# Patient Record
Sex: MALE | Race: WHITE | Employment: FULL TIME | URBAN - METROPOLITAN AREA
[De-identification: names, ages, dates, MRNs, and addresses within clinical notes are randomized per-mention and may not be internally consistent; named-entity substitution may affect disease eponyms.]

---

## 2017-06-29 ENCOUNTER — TELEPHONE (OUTPATIENT)
Dept: INTERNAL MEDICINE | Age: 52
End: 2017-06-29

## 2017-06-29 DIAGNOSIS — Z12.5 SCREENING PSA (PROSTATE SPECIFIC ANTIGEN): ICD-10-CM

## 2017-06-29 DIAGNOSIS — E11.9 DIABETES MELLITUS WITH NO COMPLICATION (HCC): Primary | ICD-10-CM

## 2017-07-08 DIAGNOSIS — Z12.5 SCREENING PSA (PROSTATE SPECIFIC ANTIGEN): ICD-10-CM

## 2017-07-08 DIAGNOSIS — E11.9 DIABETES MELLITUS WITH NO COMPLICATION (HCC): ICD-10-CM

## 2017-07-08 LAB
A/G RATIO: 1.4 (ref 1.1–2.2)
ALBUMIN SERPL-MCNC: 4.6 G/DL (ref 3.4–5)
ALP BLD-CCNC: 73 U/L (ref 40–129)
ALT SERPL-CCNC: 47 U/L (ref 10–40)
ANION GAP SERPL CALCULATED.3IONS-SCNC: 13 MMOL/L (ref 3–16)
AST SERPL-CCNC: 29 U/L (ref 15–37)
BASOPHILS ABSOLUTE: 0 K/UL (ref 0–0.2)
BASOPHILS RELATIVE PERCENT: 0.4 %
BILIRUB SERPL-MCNC: 0.4 MG/DL (ref 0–1)
BILIRUBIN URINE: NEGATIVE
BLOOD, URINE: NEGATIVE
BUN BLDV-MCNC: 19 MG/DL (ref 7–20)
CALCIUM SERPL-MCNC: 9.5 MG/DL (ref 8.3–10.6)
CHLORIDE BLD-SCNC: 104 MMOL/L (ref 99–110)
CHOLESTEROL, TOTAL: 132 MG/DL (ref 0–199)
CLARITY: CLEAR
CO2: 26 MMOL/L (ref 21–32)
COLOR: YELLOW
CREAT SERPL-MCNC: 0.7 MG/DL (ref 0.9–1.3)
CREATININE URINE: 119.1 MG/DL (ref 39–259)
EOSINOPHILS ABSOLUTE: 0.1 K/UL (ref 0–0.6)
EOSINOPHILS RELATIVE PERCENT: 1.8 %
GFR AFRICAN AMERICAN: >60
GFR NON-AFRICAN AMERICAN: >60
GLOBULIN: 3.3 G/DL
GLUCOSE BLD-MCNC: 148 MG/DL (ref 70–99)
GLUCOSE URINE: NEGATIVE MG/DL
HCT VFR BLD CALC: 47.3 % (ref 40.5–52.5)
HDLC SERPL-MCNC: 37 MG/DL (ref 40–60)
HEMOGLOBIN: 15.5 G/DL (ref 13.5–17.5)
KETONES, URINE: NEGATIVE MG/DL
LDL CHOLESTEROL CALCULATED: 73 MG/DL
LEUKOCYTE ESTERASE, URINE: NEGATIVE
LYMPHOCYTES ABSOLUTE: 3 K/UL (ref 1–5.1)
LYMPHOCYTES RELATIVE PERCENT: 37.7 %
MCH RBC QN AUTO: 29.8 PG (ref 26–34)
MCHC RBC AUTO-ENTMCNC: 32.9 G/DL (ref 31–36)
MCV RBC AUTO: 90.8 FL (ref 80–100)
MICROALBUMIN UR-MCNC: <1.2 MG/DL
MICROALBUMIN/CREAT UR-RTO: NORMAL MG/G (ref 0–30)
MICROSCOPIC EXAMINATION: NORMAL
MONOCYTES ABSOLUTE: 0.8 K/UL (ref 0–1.3)
MONOCYTES RELATIVE PERCENT: 10.3 %
NEUTROPHILS ABSOLUTE: 4 K/UL (ref 1.7–7.7)
NEUTROPHILS RELATIVE PERCENT: 49.8 %
NITRITE, URINE: NEGATIVE
PDW BLD-RTO: 13.9 % (ref 12.4–15.4)
PH UA: 6
PLATELET # BLD: 130 K/UL (ref 135–450)
PMV BLD AUTO: 10.6 FL (ref 5–10.5)
POTASSIUM SERPL-SCNC: 5.6 MMOL/L (ref 3.5–5.1)
PROSTATE SPECIFIC ANTIGEN: 0.7 NG/ML (ref 0–4)
PROTEIN UA: NEGATIVE MG/DL
RBC # BLD: 5.21 M/UL (ref 4.2–5.9)
SODIUM BLD-SCNC: 143 MMOL/L (ref 136–145)
SPECIFIC GRAVITY UA: 1.02
TOTAL PROTEIN: 7.9 G/DL (ref 6.4–8.2)
TRIGL SERPL-MCNC: 111 MG/DL (ref 0–150)
TSH REFLEX FT4: 2.73 UIU/ML (ref 0.27–4.2)
URINE TYPE: NORMAL
UROBILINOGEN, URINE: 0.2 E.U./DL
VLDLC SERPL CALC-MCNC: 22 MG/DL
WBC # BLD: 8 K/UL (ref 4–11)

## 2017-07-09 LAB
ESTIMATED AVERAGE GLUCOSE: 151.3 MG/DL
HBA1C MFR BLD: 6.9 %

## 2017-07-11 ENCOUNTER — OFFICE VISIT (OUTPATIENT)
Dept: INTERNAL MEDICINE | Age: 52
End: 2017-07-11

## 2017-07-11 VITALS
WEIGHT: 315 LBS | HEIGHT: 76 IN | SYSTOLIC BLOOD PRESSURE: 138 MMHG | HEART RATE: 68 BPM | DIASTOLIC BLOOD PRESSURE: 78 MMHG | BODY MASS INDEX: 38.36 KG/M2

## 2017-07-11 DIAGNOSIS — Z23 NEED FOR TDAP VACCINATION: ICD-10-CM

## 2017-07-11 DIAGNOSIS — Z00.00 PREVENTATIVE HEALTH CARE: Primary | ICD-10-CM

## 2017-07-11 DIAGNOSIS — E11.9 DIABETES MELLITUS WITH NO COMPLICATION (HCC): ICD-10-CM

## 2017-07-11 DIAGNOSIS — E11.9 TYPE 2 DIABETES MELLITUS WITHOUT COMPLICATION, WITHOUT LONG-TERM CURRENT USE OF INSULIN (HCC): ICD-10-CM

## 2017-07-11 LAB
HEMOCCULT STL QL: NORMAL

## 2017-07-11 PROCEDURE — 90471 IMMUNIZATION ADMIN: CPT | Performed by: INTERNAL MEDICINE

## 2017-07-11 PROCEDURE — 99396 PREV VISIT EST AGE 40-64: CPT | Performed by: INTERNAL MEDICINE

## 2017-07-11 PROCEDURE — 90715 TDAP VACCINE 7 YRS/> IM: CPT | Performed by: INTERNAL MEDICINE

## 2017-07-11 PROCEDURE — 82270 OCCULT BLOOD FECES: CPT | Performed by: INTERNAL MEDICINE

## 2017-07-11 PROCEDURE — 93000 ELECTROCARDIOGRAM COMPLETE: CPT | Performed by: INTERNAL MEDICINE

## 2017-07-11 RX ORDER — ATORVASTATIN CALCIUM 10 MG/1
10 TABLET, FILM COATED ORAL DAILY
Qty: 30 TABLET | Refills: 11 | Status: SHIPPED | OUTPATIENT
Start: 2017-07-11 | End: 2018-05-04 | Stop reason: SDUPTHER

## 2017-07-11 RX ORDER — SITAGLIPTIN AND METFORMIN HYDROCHLORIDE 100; 1000 MG/1; MG/1
TABLET, FILM COATED, EXTENDED RELEASE ORAL
Qty: 30 TABLET | Refills: 5 | Status: SHIPPED | OUTPATIENT
Start: 2017-07-11 | End: 2017-07-11 | Stop reason: SDUPTHER

## 2017-07-11 ASSESSMENT — ENCOUNTER SYMPTOMS
APNEA: 0
RESPIRATORY NEGATIVE: 1
GASTROINTESTINAL NEGATIVE: 1

## 2017-07-11 ASSESSMENT — PATIENT HEALTH QUESTIONNAIRE - PHQ9
SUM OF ALL RESPONSES TO PHQ QUESTIONS 1-9: 0
SUM OF ALL RESPONSES TO PHQ9 QUESTIONS 1 & 2: 0
1. LITTLE INTEREST OR PLEASURE IN DOING THINGS: 0
2. FEELING DOWN, DEPRESSED OR HOPELESS: 0

## 2018-01-20 DIAGNOSIS — E11.9 DIABETES MELLITUS WITH NO COMPLICATION (HCC): ICD-10-CM

## 2018-01-20 LAB
A/G RATIO: 1.5 (ref 1.1–2.2)
ALBUMIN SERPL-MCNC: 4.4 G/DL (ref 3.4–5)
ALP BLD-CCNC: 70 U/L (ref 40–129)
ALT SERPL-CCNC: 26 U/L (ref 10–40)
ANION GAP SERPL CALCULATED.3IONS-SCNC: 11 MMOL/L (ref 3–16)
AST SERPL-CCNC: 19 U/L (ref 15–37)
BILIRUB SERPL-MCNC: 0.4 MG/DL (ref 0–1)
BUN BLDV-MCNC: 20 MG/DL (ref 7–20)
CALCIUM SERPL-MCNC: 9.2 MG/DL (ref 8.3–10.6)
CHLORIDE BLD-SCNC: 101 MMOL/L (ref 99–110)
CHOLESTEROL, TOTAL: 96 MG/DL (ref 0–199)
CO2: 27 MMOL/L (ref 21–32)
CREAT SERPL-MCNC: 0.7 MG/DL (ref 0.9–1.3)
GFR AFRICAN AMERICAN: >60
GFR NON-AFRICAN AMERICAN: >60
GLOBULIN: 2.9 G/DL
GLUCOSE BLD-MCNC: 139 MG/DL (ref 70–99)
HDLC SERPL-MCNC: 40 MG/DL (ref 40–60)
LDL CHOLESTEROL CALCULATED: 41 MG/DL
POTASSIUM SERPL-SCNC: 5.3 MMOL/L (ref 3.5–5.1)
SODIUM BLD-SCNC: 139 MMOL/L (ref 136–145)
TOTAL PROTEIN: 7.3 G/DL (ref 6.4–8.2)
TRIGL SERPL-MCNC: 73 MG/DL (ref 0–150)
VITAMIN B-12: 730 PG/ML (ref 211–911)
VLDLC SERPL CALC-MCNC: 15 MG/DL

## 2018-01-21 LAB
ESTIMATED AVERAGE GLUCOSE: 151.3 MG/DL
HBA1C MFR BLD: 6.9 %

## 2018-01-23 ENCOUNTER — TELEPHONE (OUTPATIENT)
Dept: INTERNAL MEDICINE | Age: 53
End: 2018-01-23

## 2018-02-05 RX ORDER — SITAGLIPTIN AND METFORMIN HYDROCHLORIDE 100; 1000 MG/1; MG/1
TABLET, FILM COATED, EXTENDED RELEASE ORAL
Qty: 30 TABLET | Refills: 4 | Status: SHIPPED | OUTPATIENT
Start: 2018-02-05 | End: 2018-05-04 | Stop reason: SDUPTHER

## 2018-05-04 ENCOUNTER — OFFICE VISIT (OUTPATIENT)
Dept: INTERNAL MEDICINE | Age: 53
End: 2018-05-04

## 2018-05-04 VITALS
BODY MASS INDEX: 38.11 KG/M2 | WEIGHT: 313 LBS | HEART RATE: 73 BPM | DIASTOLIC BLOOD PRESSURE: 88 MMHG | HEIGHT: 76 IN | SYSTOLIC BLOOD PRESSURE: 138 MMHG | OXYGEN SATURATION: 98 %

## 2018-05-04 DIAGNOSIS — E11.9 TYPE 2 DIABETES MELLITUS WITHOUT COMPLICATION, WITHOUT LONG-TERM CURRENT USE OF INSULIN (HCC): Primary | ICD-10-CM

## 2018-05-04 DIAGNOSIS — R41.3 MEMORY DISTURBANCE: ICD-10-CM

## 2018-05-04 DIAGNOSIS — Z91.09 SENSITIVITY TO SUNLIGHT: ICD-10-CM

## 2018-05-04 PROCEDURE — 99214 OFFICE O/P EST MOD 30 MIN: CPT | Performed by: INTERNAL MEDICINE

## 2018-05-04 RX ORDER — ATORVASTATIN CALCIUM 10 MG/1
10 TABLET, FILM COATED ORAL DAILY
Qty: 30 TABLET | Refills: 11 | Status: SHIPPED | OUTPATIENT
Start: 2018-05-04 | End: 2018-06-13 | Stop reason: SDUPTHER

## 2018-05-04 ASSESSMENT — ENCOUNTER SYMPTOMS
COLOR CHANGE: 1
RESPIRATORY NEGATIVE: 1
GASTROINTESTINAL NEGATIVE: 1
ALLERGIC/IMMUNOLOGIC NEGATIVE: 1

## 2018-05-18 ENCOUNTER — TELEPHONE (OUTPATIENT)
Dept: INTERNAL MEDICINE | Age: 53
End: 2018-05-18

## 2018-06-13 RX ORDER — ATORVASTATIN CALCIUM 10 MG/1
10 TABLET, FILM COATED ORAL DAILY
Qty: 30 TABLET | Refills: 10 | Status: SHIPPED | OUTPATIENT
Start: 2018-06-13 | End: 2019-05-11 | Stop reason: SDUPTHER

## 2018-06-18 RX ORDER — SITAGLIPTIN AND METFORMIN HYDROCHLORIDE 100; 1000 MG/1; MG/1
TABLET, FILM COATED, EXTENDED RELEASE ORAL
Qty: 30 TABLET | Refills: 3 | Status: SHIPPED | OUTPATIENT
Start: 2018-06-18 | End: 2018-11-11 | Stop reason: SDUPTHER

## 2018-07-06 DIAGNOSIS — E11.9 TYPE 2 DIABETES MELLITUS WITHOUT COMPLICATION, WITHOUT LONG-TERM CURRENT USE OF INSULIN (HCC): ICD-10-CM

## 2018-07-06 LAB
A/G RATIO: 1.6 (ref 1.1–2.2)
ALBUMIN SERPL-MCNC: 4.5 G/DL (ref 3.4–5)
ALP BLD-CCNC: 73 U/L (ref 40–129)
ALT SERPL-CCNC: 29 U/L (ref 10–40)
ANION GAP SERPL CALCULATED.3IONS-SCNC: 12 MMOL/L (ref 3–16)
AST SERPL-CCNC: 20 U/L (ref 15–37)
BILIRUB SERPL-MCNC: 0.4 MG/DL (ref 0–1)
BUN BLDV-MCNC: 21 MG/DL (ref 7–20)
CALCIUM SERPL-MCNC: 9.4 MG/DL (ref 8.3–10.6)
CHLORIDE BLD-SCNC: 102 MMOL/L (ref 99–110)
CHOLESTEROL, TOTAL: 92 MG/DL (ref 0–199)
CO2: 27 MMOL/L (ref 21–32)
CREAT SERPL-MCNC: 0.8 MG/DL (ref 0.9–1.3)
GFR AFRICAN AMERICAN: >60
GFR NON-AFRICAN AMERICAN: >60
GLOBULIN: 2.8 G/DL
GLUCOSE BLD-MCNC: 142 MG/DL (ref 70–99)
HDLC SERPL-MCNC: 37 MG/DL (ref 40–60)
LDL CHOLESTEROL CALCULATED: 39 MG/DL
POTASSIUM SERPL-SCNC: 5 MMOL/L (ref 3.5–5.1)
SODIUM BLD-SCNC: 141 MMOL/L (ref 136–145)
TOTAL PROTEIN: 7.3 G/DL (ref 6.4–8.2)
TRIGL SERPL-MCNC: 82 MG/DL (ref 0–150)
VLDLC SERPL CALC-MCNC: 16 MG/DL

## 2018-07-07 LAB
ESTIMATED AVERAGE GLUCOSE: 151.3 MG/DL
HBA1C MFR BLD: 6.9 %

## 2018-09-26 PROBLEM — Z00.00 PREVENTATIVE HEALTH CARE: Status: RESOLVED | Noted: 2017-07-11 | Resolved: 2018-09-26

## 2018-11-12 RX ORDER — SITAGLIPTIN AND METFORMIN HYDROCHLORIDE 100; 1000 MG/1; MG/1
TABLET, FILM COATED, EXTENDED RELEASE ORAL
Qty: 30 TABLET | Refills: 2 | Status: SHIPPED | OUTPATIENT
Start: 2018-11-12 | End: 2019-02-05 | Stop reason: SDUPTHER

## 2019-02-05 RX ORDER — SITAGLIPTIN AND METFORMIN HYDROCHLORIDE 100; 1000 MG/1; MG/1
TABLET, FILM COATED, EXTENDED RELEASE ORAL
Qty: 30 TABLET | Refills: 5 | Status: SHIPPED | OUTPATIENT
Start: 2019-02-05 | End: 2019-08-05 | Stop reason: SDUPTHER

## 2019-05-13 RX ORDER — ATORVASTATIN CALCIUM 10 MG/1
10 TABLET, FILM COATED ORAL DAILY
Qty: 30 TABLET | Refills: 0 | Status: SHIPPED | OUTPATIENT
Start: 2019-05-13 | End: 2019-06-13 | Stop reason: SDUPTHER

## 2019-06-12 RX ORDER — ATORVASTATIN CALCIUM 10 MG/1
TABLET, FILM COATED ORAL
Qty: 30 TABLET | Refills: 0 | OUTPATIENT
Start: 2019-06-12

## 2019-06-12 NOTE — TELEPHONE ENCOUNTER
Patient has not been seen since 05/04/18. Patient needs to schedule appointment for prescription to be refilled.

## 2019-06-13 RX ORDER — ATORVASTATIN CALCIUM 10 MG/1
TABLET, FILM COATED ORAL
Qty: 30 TABLET | Refills: 0 | Status: SHIPPED | OUTPATIENT
Start: 2019-06-13 | End: 2019-07-06 | Stop reason: SDUPTHER

## 2019-06-21 ENCOUNTER — TELEPHONE (OUTPATIENT)
Dept: INTERNAL MEDICINE CLINIC | Age: 54
End: 2019-06-21

## 2019-06-21 ENCOUNTER — OFFICE VISIT (OUTPATIENT)
Dept: INTERNAL MEDICINE CLINIC | Age: 54
End: 2019-06-21
Payer: COMMERCIAL

## 2019-06-21 VITALS
BODY MASS INDEX: 39.8 KG/M2 | DIASTOLIC BLOOD PRESSURE: 80 MMHG | HEART RATE: 105 BPM | SYSTOLIC BLOOD PRESSURE: 160 MMHG | OXYGEN SATURATION: 97 % | WEIGHT: 315 LBS

## 2019-06-21 DIAGNOSIS — J02.9 SORE THROAT: Primary | ICD-10-CM

## 2019-06-21 DIAGNOSIS — B08.4 HAND, FOOT AND MOUTH DISEASE: ICD-10-CM

## 2019-06-21 LAB — S PYO AG THROAT QL: NORMAL

## 2019-06-21 PROCEDURE — 99213 OFFICE O/P EST LOW 20 MIN: CPT | Performed by: NURSE PRACTITIONER

## 2019-06-21 PROCEDURE — 87880 STREP A ASSAY W/OPTIC: CPT | Performed by: NURSE PRACTITIONER

## 2019-06-21 RX ORDER — LIDOCAINE HYDROCHLORIDE 20 MG/ML
15 SOLUTION OROPHARYNGEAL PRN
Qty: 100 ML | Refills: 0 | Status: SHIPPED | OUTPATIENT
Start: 2019-06-21 | End: 2020-05-29

## 2019-06-21 NOTE — TELEPHONE ENCOUNTER
[facility]'S needs to know directions (frequency of use) for the Lidocaine prescribed.   Please call [phone]

## 2019-06-21 NOTE — PROGRESS NOTES
Subjective:      Patient ID: Shiv Lund is a 47 y.o. male. Chief Complaint   Patient presents with    Pharyngitis     blisters behind the gums       HPI   Kevin Stockton is here for blisters and open sores in mouth and throat. Symptoms began 2 days ago. Sores are present on his throat, gums, and sides of tongue. He denies fever. Was in contact with his grandsons who were both sick with fevers last week. He reports pain with eating and swallowing. Feels like sores are getting worse. Denies open lesions to any other part of his body. Review of Systems   Constitutional: Negative for chills, fatigue and fever. HENT: Positive for mouth sores and sore throat. Negative for congestion, postnasal drip, rhinorrhea, sinus pressure and sinus pain. Respiratory: Negative for cough, shortness of breath and wheezing. Cardiovascular: Negative for chest pain and palpitations. Gastrointestinal: Negative for abdominal pain, constipation and diarrhea. Musculoskeletal: Negative for arthralgias, back pain and myalgias. Skin: Negative for color change, pallor and rash. Neurological: Negative for dizziness, syncope, weakness, light-headedness and headaches. Psychiatric/Behavioral: Negative for behavioral problems, confusion and sleep disturbance. The patient is not nervous/anxious. Objective:   Physical Exam   Constitutional: He is oriented to person, place, and time. He appears well-developed and well-nourished. HENT:   Head: Normocephalic and atraumatic. Mouth/Throat: Oral lesions (multiple, throughout throat, gums, and tongue) present. No oropharyngeal exudate, posterior oropharyngeal edema or posterior oropharyngeal erythema. Eyes: Pupils are equal, round, and reactive to light. Conjunctivae and EOM are normal.   Neck: Normal range of motion. Neck supple. No JVD present. No thyromegaly present. Cardiovascular: Normal rate, regular rhythm and normal heart sounds.    Pulmonary/Chest: Effort normal

## 2019-06-21 NOTE — PATIENT INSTRUCTIONS
Lidocaine   Liquid benadryl   Maalox     Mix together, equal parts   Swish and spit up to 4 times a day

## 2019-06-24 PROBLEM — B08.4 HAND, FOOT AND MOUTH DISEASE: Status: ACTIVE | Noted: 2019-06-24

## 2019-06-24 ASSESSMENT — ENCOUNTER SYMPTOMS
SHORTNESS OF BREATH: 0
RHINORRHEA: 0
DIARRHEA: 0
SINUS PRESSURE: 0
COUGH: 0
CONSTIPATION: 0
WHEEZING: 0
SINUS PAIN: 0
SORE THROAT: 1
BACK PAIN: 0
ABDOMINAL PAIN: 0
COLOR CHANGE: 0

## 2019-06-24 NOTE — ASSESSMENT & PLAN NOTE
Onset of symptoms consistent with HFMD  Likely contracted from sick grandchildren   Start magic mouthwash TID prn   Suggested room temperature foods and liquids as tolerated   Tylenol for fever and body aches   Discussed importance of good hand hygiene to prevent spread

## 2019-06-27 ENCOUNTER — TELEPHONE (OUTPATIENT)
Dept: INTERNAL MEDICINE CLINIC | Age: 54
End: 2019-06-27

## 2019-06-27 DIAGNOSIS — F41.9 ANXIETY: Primary | ICD-10-CM

## 2019-06-27 RX ORDER — ALPRAZOLAM 0.5 MG/1
TABLET ORAL
Qty: 6 TABLET | Refills: 0 | OUTPATIENT
Start: 2019-06-27 | End: 2019-07-27

## 2019-06-27 NOTE — TELEPHONE ENCOUNTER
Run oarrs  Xanax 0.5mg  #6     Take 1 tab approx 30 minutes prior to flight. May repeat rx in 4 hours if necessary. .  No alcohol.

## 2019-07-09 RX ORDER — ATORVASTATIN CALCIUM 10 MG/1
TABLET, FILM COATED ORAL
Qty: 30 TABLET | Refills: 0 | Status: SHIPPED | OUTPATIENT
Start: 2019-07-09 | End: 2019-08-05 | Stop reason: SDUPTHER

## 2019-08-05 RX ORDER — ATORVASTATIN CALCIUM 10 MG/1
TABLET, FILM COATED ORAL
Qty: 30 TABLET | Refills: 0 | Status: SHIPPED | OUTPATIENT
Start: 2019-08-05 | End: 2019-09-10 | Stop reason: SDUPTHER

## 2019-08-05 RX ORDER — SITAGLIPTIN AND METFORMIN HYDROCHLORIDE 100; 1000 MG/1; MG/1
TABLET, FILM COATED, EXTENDED RELEASE ORAL
Qty: 30 TABLET | Refills: 4 | Status: SHIPPED | OUTPATIENT
Start: 2019-08-05 | End: 2020-01-10

## 2019-08-05 RX ORDER — ATORVASTATIN CALCIUM 10 MG/1
TABLET, FILM COATED ORAL
Qty: 30 TABLET | Refills: 0 | OUTPATIENT
Start: 2019-08-05

## 2019-08-09 DIAGNOSIS — E11.9 DIABETES MELLITUS WITH NO COMPLICATION (HCC): ICD-10-CM

## 2019-08-09 DIAGNOSIS — E11.9 DIABETES MELLITUS WITH NO COMPLICATION (HCC): Primary | ICD-10-CM

## 2019-08-09 LAB
A/G RATIO: 1.7 (ref 1.1–2.2)
ALBUMIN SERPL-MCNC: 4.6 G/DL (ref 3.4–5)
ALP BLD-CCNC: 78 U/L (ref 40–129)
ALT SERPL-CCNC: 49 U/L (ref 10–40)
ANION GAP SERPL CALCULATED.3IONS-SCNC: 13 MMOL/L (ref 3–16)
AST SERPL-CCNC: 32 U/L (ref 15–37)
BILIRUB SERPL-MCNC: 0.4 MG/DL (ref 0–1)
BUN BLDV-MCNC: 17 MG/DL (ref 7–20)
CALCIUM SERPL-MCNC: 9.7 MG/DL (ref 8.3–10.6)
CHLORIDE BLD-SCNC: 102 MMOL/L (ref 99–110)
CHOLESTEROL, TOTAL: 102 MG/DL (ref 0–199)
CO2: 26 MMOL/L (ref 21–32)
CREAT SERPL-MCNC: 0.7 MG/DL (ref 0.9–1.3)
GFR AFRICAN AMERICAN: >60
GFR NON-AFRICAN AMERICAN: >60
GLOBULIN: 2.7 G/DL
GLUCOSE BLD-MCNC: 203 MG/DL (ref 70–99)
HDLC SERPL-MCNC: 42 MG/DL (ref 40–60)
LDL CHOLESTEROL CALCULATED: 41 MG/DL
POTASSIUM SERPL-SCNC: 5.3 MMOL/L (ref 3.5–5.1)
SODIUM BLD-SCNC: 141 MMOL/L (ref 136–145)
TOTAL PROTEIN: 7.3 G/DL (ref 6.4–8.2)
TRIGL SERPL-MCNC: 93 MG/DL (ref 0–150)
VLDLC SERPL CALC-MCNC: 19 MG/DL

## 2019-08-10 LAB
ESTIMATED AVERAGE GLUCOSE: 180 MG/DL
HBA1C MFR BLD: 7.9 %

## 2019-09-10 RX ORDER — ATORVASTATIN CALCIUM 10 MG/1
TABLET, FILM COATED ORAL
Qty: 15 TABLET | Refills: 0 | Status: SHIPPED | OUTPATIENT
Start: 2019-09-10 | End: 2020-05-29

## 2020-01-10 RX ORDER — SITAGLIPTIN AND METFORMIN HYDROCHLORIDE 100; 1000 MG/1; MG/1
TABLET, FILM COATED, EXTENDED RELEASE ORAL
Qty: 30 TABLET | Refills: 0 | Status: SHIPPED | OUTPATIENT
Start: 2020-01-10 | End: 2020-02-12

## 2020-01-17 ENCOUNTER — OFFICE VISIT (OUTPATIENT)
Dept: INTERNAL MEDICINE CLINIC | Age: 55
End: 2020-01-17
Payer: COMMERCIAL

## 2020-01-17 VITALS
HEART RATE: 80 BPM | SYSTOLIC BLOOD PRESSURE: 136 MMHG | BODY MASS INDEX: 39.93 KG/M2 | WEIGHT: 315 LBS | DIASTOLIC BLOOD PRESSURE: 76 MMHG | OXYGEN SATURATION: 96 %

## 2020-01-17 PROBLEM — H10.9 BACTERIAL CONJUNCTIVITIS OF LEFT EYE: Status: ACTIVE | Noted: 2020-01-17

## 2020-01-17 PROCEDURE — 99213 OFFICE O/P EST LOW 20 MIN: CPT | Performed by: NURSE PRACTITIONER

## 2020-01-17 RX ORDER — ERYTHROMYCIN 5 MG/G
OINTMENT OPHTHALMIC
Qty: 3.5 G | Refills: 0 | Status: SHIPPED | OUTPATIENT
Start: 2020-01-17 | End: 2020-05-29

## 2020-01-17 ASSESSMENT — ENCOUNTER SYMPTOMS
EYE ITCHING: 1
SHORTNESS OF BREATH: 0
EYE PAIN: 0
SINUS PAIN: 0
PHOTOPHOBIA: 0
BACK PAIN: 0
EYE DISCHARGE: 1
SINUS PRESSURE: 0
COLOR CHANGE: 0
WHEEZING: 0
CONSTIPATION: 0
COUGH: 0
DIARRHEA: 0
EYE REDNESS: 1
ABDOMINAL PAIN: 0

## 2020-01-17 NOTE — PROGRESS NOTES
Subjective:      Patient ID: Marv Cowart is a 47 y.o. male. Chief Complaint   Patient presents with    Eye Problem     L / swollen & drainage x sunday     HPI   Here for left eye redness and drainage that started yesterday. Reports lots of green/ yellow drainage last night. He had to wake up 3 times to wipe it away. Eye is itchy and irritated but not painful. No vision changes. No photophobia. Review of Systems   Constitutional: Negative for chills, fatigue and fever. HENT: Negative for congestion, sinus pressure and sinus pain. Eyes: Positive for discharge, redness and itching. Negative for photophobia, pain and visual disturbance. Respiratory: Negative for cough, shortness of breath and wheezing. Cardiovascular: Negative for chest pain and palpitations. Gastrointestinal: Negative for abdominal pain, constipation and diarrhea. Musculoskeletal: Negative for arthralgias, back pain and myalgias. Skin: Negative for color change, pallor and rash. Neurological: Negative for dizziness, syncope, weakness, light-headedness and headaches. Psychiatric/Behavioral: Negative for behavioral problems, confusion and sleep disturbance. The patient is not nervous/anxious. Objective:   Physical Exam  Constitutional:       Appearance: He is well-developed. HENT:      Head: Normocephalic and atraumatic. Eyes:      Extraocular Movements:      Right eye: Normal extraocular motion. Left eye: Normal extraocular motion. Conjunctiva/sclera:      Right eye: Right conjunctiva is not injected. No chemosis, exudate or hemorrhage. Left eye: Left conjunctiva is injected. Exudate present. No chemosis or hemorrhage. Pupils: Pupils are equal, round, and reactive to light. Neck:      Musculoskeletal: Normal range of motion and neck supple. Thyroid: No thyromegaly. Vascular: No JVD. Cardiovascular:      Rate and Rhythm: Normal rate and regular rhythm.       Heart sounds: Normal heart sounds. Pulmonary:      Effort: Pulmonary effort is normal. No respiratory distress. Breath sounds: Normal breath sounds. No wheezing. Musculoskeletal: Normal range of motion. General: No deformity. Skin:     General: Skin is warm and dry. Capillary Refill: Capillary refill takes less than 2 seconds. Neurological:      Mental Status: He is alert and oriented to person, place, and time. Psychiatric:         Mood and Affect: Mood normal.         Behavior: Behavior normal.         Assessment:      See Problem List assessment and plan       Plan:       Bacterial conjunctivitis of left eye  Start erythromycin ointment   No vision changes or pain   Encouraged good hand hygiene to avoid spread   Call if symptoms worsen or fail to improve                Patient engaged in shared decision making. Information given to evaluate options of treatment, understand what is needed and discuss importance of following plan.

## 2020-01-17 NOTE — ASSESSMENT & PLAN NOTE
Start erythromycin ointment   No vision changes or pain   Encouraged good hand hygiene to avoid spread   Call if symptoms worsen or fail to improve

## 2020-01-20 ENCOUNTER — TELEPHONE (OUTPATIENT)
Dept: INTERNAL MEDICINE CLINIC | Age: 55
End: 2020-01-20

## 2020-01-20 RX ORDER — SULFAMETHOXAZOLE AND TRIMETHOPRIM 800; 160 MG/1; MG/1
TABLET ORAL
Qty: 14 TABLET | Refills: 0 | Status: SHIPPED | OUTPATIENT
Start: 2020-01-20 | End: 2020-05-29

## 2020-01-20 RX ORDER — TOBRAMYCIN AND DEXAMETHASONE 3; 1 MG/ML; MG/ML
SUSPENSION/ DROPS OPHTHALMIC
Qty: 5 ML | Refills: 0 | Status: SHIPPED | OUTPATIENT
Start: 2020-01-20 | End: 2020-05-29

## 2020-01-20 NOTE — TELEPHONE ENCOUNTER
Bactrim DS   14              1 twice daily  TobraDex ophthalmic solution     5 mL's            1 drop affected eye 4 times daily

## 2020-05-15 ENCOUNTER — TELEPHONE (OUTPATIENT)
Dept: INTERNAL MEDICINE CLINIC | Age: 55
End: 2020-05-15

## 2020-05-20 ENCOUNTER — TELEPHONE (OUTPATIENT)
Dept: INTERNAL MEDICINE CLINIC | Age: 55
End: 2020-05-20

## 2020-05-26 DIAGNOSIS — E11.9 DIABETES MELLITUS WITH NO COMPLICATION (HCC): ICD-10-CM

## 2020-05-26 DIAGNOSIS — Z12.5 SCREENING PSA (PROSTATE SPECIFIC ANTIGEN): ICD-10-CM

## 2020-05-26 LAB
A/G RATIO: 1.6 (ref 1.1–2.2)
ALBUMIN SERPL-MCNC: 4.5 G/DL (ref 3.4–5)
ALP BLD-CCNC: 83 U/L (ref 40–129)
ALT SERPL-CCNC: 36 U/L (ref 10–40)
ANION GAP SERPL CALCULATED.3IONS-SCNC: 14 MMOL/L (ref 3–16)
AST SERPL-CCNC: 23 U/L (ref 15–37)
BASOPHILS ABSOLUTE: 0 K/UL (ref 0–0.2)
BASOPHILS RELATIVE PERCENT: 0.5 %
BILIRUB SERPL-MCNC: 0.4 MG/DL (ref 0–1)
BUN BLDV-MCNC: 18 MG/DL (ref 7–20)
CALCIUM SERPL-MCNC: 9.4 MG/DL (ref 8.3–10.6)
CHLORIDE BLD-SCNC: 98 MMOL/L (ref 99–110)
CHOLESTEROL, TOTAL: 111 MG/DL (ref 0–199)
CO2: 25 MMOL/L (ref 21–32)
CREAT SERPL-MCNC: 0.8 MG/DL (ref 0.9–1.3)
CREATININE URINE: 132.6 MG/DL (ref 39–259)
EOSINOPHILS ABSOLUTE: 0.1 K/UL (ref 0–0.6)
EOSINOPHILS RELATIVE PERCENT: 1.1 %
GFR AFRICAN AMERICAN: >60
GFR NON-AFRICAN AMERICAN: >60
GLOBULIN: 2.8 G/DL
GLUCOSE BLD-MCNC: 231 MG/DL (ref 70–99)
HCT VFR BLD CALC: 46.2 % (ref 40.5–52.5)
HDLC SERPL-MCNC: 41 MG/DL (ref 40–60)
HEMOGLOBIN: 15.2 G/DL (ref 13.5–17.5)
LDL CHOLESTEROL CALCULATED: 53 MG/DL
LYMPHOCYTES ABSOLUTE: 3.1 K/UL (ref 1–5.1)
LYMPHOCYTES RELATIVE PERCENT: 37.3 %
MCH RBC QN AUTO: 30.1 PG (ref 26–34)
MCHC RBC AUTO-ENTMCNC: 32.9 G/DL (ref 31–36)
MCV RBC AUTO: 91.5 FL (ref 80–100)
MICROALBUMIN UR-MCNC: 1.8 MG/DL
MICROALBUMIN/CREAT UR-RTO: 13.6 MG/G (ref 0–30)
MONOCYTES ABSOLUTE: 0.8 K/UL (ref 0–1.3)
MONOCYTES RELATIVE PERCENT: 9.5 %
NEUTROPHILS ABSOLUTE: 4.3 K/UL (ref 1.7–7.7)
NEUTROPHILS RELATIVE PERCENT: 51.6 %
PDW BLD-RTO: 13.7 % (ref 12.4–15.4)
PLATELET # BLD: 111 K/UL (ref 135–450)
PMV BLD AUTO: 10.6 FL (ref 5–10.5)
POTASSIUM SERPL-SCNC: 5.1 MMOL/L (ref 3.5–5.1)
PROSTATE SPECIFIC ANTIGEN: 0.8 NG/ML (ref 0–4)
RBC # BLD: 5.05 M/UL (ref 4.2–5.9)
SODIUM BLD-SCNC: 137 MMOL/L (ref 136–145)
TOTAL PROTEIN: 7.3 G/DL (ref 6.4–8.2)
TRIGL SERPL-MCNC: 85 MG/DL (ref 0–150)
TSH REFLEX FT4: 3.78 UIU/ML (ref 0.27–4.2)
VLDLC SERPL CALC-MCNC: 17 MG/DL
WBC # BLD: 8.2 K/UL (ref 4–11)

## 2020-05-27 LAB
ESTIMATED AVERAGE GLUCOSE: 197.3 MG/DL
HBA1C MFR BLD: 8.5 %

## 2020-05-29 ENCOUNTER — OFFICE VISIT (OUTPATIENT)
Dept: INTERNAL MEDICINE CLINIC | Age: 55
End: 2020-05-29
Payer: COMMERCIAL

## 2020-05-29 VITALS
RESPIRATION RATE: 14 BRPM | BODY MASS INDEX: 39.39 KG/M2 | SYSTOLIC BLOOD PRESSURE: 138 MMHG | HEART RATE: 76 BPM | DIASTOLIC BLOOD PRESSURE: 80 MMHG | WEIGHT: 315 LBS

## 2020-05-29 PROBLEM — R41.3 MEMORY DISTURBANCE: Status: RESOLVED | Noted: 2018-05-04 | Resolved: 2020-05-29

## 2020-05-29 PROBLEM — H10.9 BACTERIAL CONJUNCTIVITIS OF LEFT EYE: Status: RESOLVED | Noted: 2020-01-17 | Resolved: 2020-05-29

## 2020-05-29 PROBLEM — D69.6 THROMBOCYTOPENIA (HCC): Status: ACTIVE | Noted: 2020-05-29

## 2020-05-29 PROBLEM — Z91.09 SENSITIVITY TO SUNLIGHT: Status: RESOLVED | Noted: 2018-05-04 | Resolved: 2020-05-29

## 2020-05-29 PROBLEM — E78.2 HYPERLIPIDEMIA, MIXED: Status: ACTIVE | Noted: 2020-05-29

## 2020-05-29 PROBLEM — B08.4 HAND, FOOT AND MOUTH DISEASE: Status: RESOLVED | Noted: 2019-06-24 | Resolved: 2020-05-29

## 2020-05-29 PROCEDURE — 3052F HG A1C>EQUAL 8.0%<EQUAL 9.0%: CPT | Performed by: INTERNAL MEDICINE

## 2020-05-29 PROCEDURE — 99213 OFFICE O/P EST LOW 20 MIN: CPT | Performed by: INTERNAL MEDICINE

## 2020-05-29 PROCEDURE — 99396 PREV VISIT EST AGE 40-64: CPT | Performed by: INTERNAL MEDICINE

## 2020-05-29 ASSESSMENT — ENCOUNTER SYMPTOMS
RESPIRATORY NEGATIVE: 1
GASTROINTESTINAL NEGATIVE: 1
TROUBLE SWALLOWING: 0

## 2020-05-29 NOTE — PROGRESS NOTES
issues. Hyperlipidemia, mixed  Take Lipitor twice per week due to hand cramping. He notices the cramping is better when he holds Lipitor. PLAN:See ASSESSMENT for evaluation & PLAN     Orders Placed This Encounter   Procedures    Comprehensive Metabolic Panel     Standing Status:   Future     Standing Expiration Date:   5/29/2021    Lipid Panel     Standing Status:   Future     Standing Expiration Date:   5/29/2021     Order Specific Question:   Is Patient Fasting?/# of Hours     Answer:   yes - 8 hours    Hemoglobin A1C     Standing Status:   Future     Standing Expiration Date:   5/29/2021    CBC Auto Differential     Standing Status:   Future     Standing Expiration Date:   5/29/2021    POCT occult blood stool     , PMH, SH and FH reviewed and noted. Recent and past labs, tests and consultsalso reviewed. Recent or new meds also reviewed.

## 2020-06-04 ENCOUNTER — TELEPHONE (OUTPATIENT)
Dept: INTERNAL MEDICINE CLINIC | Age: 55
End: 2020-06-04

## 2020-06-28 PROBLEM — Z00.00 PREVENTATIVE HEALTH CARE: Status: RESOLVED | Noted: 2017-07-11 | Resolved: 2020-06-28

## 2020-08-10 NOTE — TELEPHONE ENCOUNTER
Can they get me a copy of the diabetic medications that are covered at what price levels with their insurance company. This will be easier than just patricio.

## 2020-08-11 RX ORDER — SITAGLIPTIN AND METFORMIN HYDROCHLORIDE 100; 1000 MG/1; MG/1
TABLET, FILM COATED, EXTENDED RELEASE ORAL
Qty: 30 TABLET | Refills: 3 | Status: SHIPPED | OUTPATIENT
Start: 2020-08-11 | End: 2020-11-25 | Stop reason: SDUPTHER

## 2020-08-21 ENCOUNTER — TELEPHONE (OUTPATIENT)
Dept: INTERNAL MEDICINE CLINIC | Age: 55
End: 2020-08-21

## 2020-08-21 RX ORDER — EMPAGLIFLOZIN 25 MG/1
1 TABLET, FILM COATED ORAL DAILY
Qty: 90 TABLET | Refills: 3 | Status: SHIPPED | OUTPATIENT
Start: 2020-08-21 | End: 2021-12-20 | Stop reason: SDUPTHER

## 2020-08-21 NOTE — TELEPHONE ENCOUNTER
Add Jardiance 25 mg      #90        1 daily         refill 3  Have patient get a CMP and A1c in 2 months and follow-up with me.

## 2020-08-21 NOTE — TELEPHONE ENCOUNTER
Patient can get Janumet without a PA. We cannot get approval for Rene Marshall because patient has not failed Jardiance and Invokana. Noted in patient's chart. Pt wife is insisting on having the farxiga.

## 2020-09-04 ENCOUNTER — TELEPHONE (OUTPATIENT)
Dept: INTERNAL MEDICINE CLINIC | Age: 55
End: 2020-09-04

## 2020-09-04 RX ORDER — AMOXICILLIN AND CLAVULANATE POTASSIUM 875; 125 MG/1; MG/1
1 TABLET, FILM COATED ORAL 2 TIMES DAILY
Qty: 20 TABLET | Refills: 0 | Status: SHIPPED | OUTPATIENT
Start: 2020-09-04 | End: 2020-09-14

## 2020-09-04 NOTE — TELEPHONE ENCOUNTER
Sure augmentin 875 #20 1 bid and tell them to try to squeeze out as much of the drainage as they can and if it gets more red over the weekend in spite of abx go to the ER since it can be a dangerous thing w a diabetic pt

## 2020-09-04 NOTE — TELEPHONE ENCOUNTER
Patients wife calling in regards to patients boil, it popped and green pus came out (thinks it is infected). He has an appointment with Dr. Guanakito Wells on 9/9/20 for this issue but she is requesting an antibiotic before then. She mentions patient is diabetic. Please Advise. Patients PCP is Dr. Sera Chicas and he is not here today.

## 2020-09-04 NOTE — TELEPHONE ENCOUNTER
Dr Luana Duncan at bedside; [address] [facility] performed Sent to on call

## 2020-09-09 ENCOUNTER — OFFICE VISIT (OUTPATIENT)
Dept: INTERNAL MEDICINE CLINIC | Age: 55
End: 2020-09-09
Payer: COMMERCIAL

## 2020-09-09 VITALS
SYSTOLIC BLOOD PRESSURE: 140 MMHG | WEIGHT: 309 LBS | DIASTOLIC BLOOD PRESSURE: 80 MMHG | BODY MASS INDEX: 37.63 KG/M2 | HEIGHT: 76 IN | TEMPERATURE: 98.2 F

## 2020-09-09 PROCEDURE — 99213 OFFICE O/P EST LOW 20 MIN: CPT | Performed by: INTERNAL MEDICINE

## 2020-09-09 SDOH — ECONOMIC STABILITY: FOOD INSECURITY: WITHIN THE PAST 12 MONTHS, THE FOOD YOU BOUGHT JUST DIDN'T LAST AND YOU DIDN'T HAVE MONEY TO GET MORE.: NEVER TRUE

## 2020-09-09 SDOH — ECONOMIC STABILITY: TRANSPORTATION INSECURITY
IN THE PAST 12 MONTHS, HAS THE LACK OF TRANSPORTATION KEPT YOU FROM MEDICAL APPOINTMENTS OR FROM GETTING MEDICATIONS?: NO

## 2020-09-09 SDOH — ECONOMIC STABILITY: TRANSPORTATION INSECURITY
IN THE PAST 12 MONTHS, HAS LACK OF TRANSPORTATION KEPT YOU FROM MEETINGS, WORK, OR FROM GETTING THINGS NEEDED FOR DAILY LIVING?: NO

## 2020-09-09 SDOH — ECONOMIC STABILITY: FOOD INSECURITY: WITHIN THE PAST 12 MONTHS, YOU WORRIED THAT YOUR FOOD WOULD RUN OUT BEFORE YOU GOT MONEY TO BUY MORE.: NEVER TRUE

## 2020-09-09 ASSESSMENT — ENCOUNTER SYMPTOMS
ABDOMINAL PAIN: 0
NAUSEA: 0
CHEST TIGHTNESS: 0
BACK PAIN: 0
SHORTNESS OF BREATH: 0
EYE REDNESS: 0

## 2020-09-09 ASSESSMENT — PATIENT HEALTH QUESTIONNAIRE - PHQ9
SUM OF ALL RESPONSES TO PHQ QUESTIONS 1-9: 0
2. FEELING DOWN, DEPRESSED OR HOPELESS: 0
1. LITTLE INTEREST OR PLEASURE IN DOING THINGS: 0
SUM OF ALL RESPONSES TO PHQ QUESTIONS 1-9: 0
SUM OF ALL RESPONSES TO PHQ9 QUESTIONS 1 & 2: 0

## 2020-09-09 NOTE — PROGRESS NOTES
for agitation. The patient is not nervous/anxious. Objective:   Physical Exam  Constitutional:       Appearance: He is obese. Cardiovascular:      Rate and Rhythm: Normal rate and regular rhythm. Pulmonary:      Effort: Pulmonary effort is normal.      Breath sounds: Normal breath sounds. No wheezing or rales. Musculoskeletal:      Right lower leg: No edema. Left lower leg: No edema. Skin:     Comments: 10 cm subcutaneous palpable cyst.  Mild scattered superficial inflammation. No current drainage. Patient reports improving discomfort. Neurological:      Mental Status: He is alert. Assessment and plan       1. Inflamed sebaceous cyst  Improving. Continue antibiotics. Referred to Dr. Judith Cooper for excision. Patient does not want to consider this until after the leaf removal season.

## 2020-09-09 NOTE — PATIENT INSTRUCTIONS
Dr. Sarah Dobbs  [id number]  [address] [facility]    [address]  P: [phone]

## 2020-10-10 DIAGNOSIS — E11.9 DIABETES MELLITUS WITH NO COMPLICATION (HCC): ICD-10-CM

## 2020-10-10 DIAGNOSIS — D69.6 THROMBOCYTOPENIA (HCC): ICD-10-CM

## 2020-10-10 LAB
A/G RATIO: 1.4 (ref 1.1–2.2)
ALBUMIN SERPL-MCNC: 4.2 G/DL (ref 3.4–5)
ALP BLD-CCNC: 84 U/L (ref 40–129)
ALT SERPL-CCNC: 33 U/L (ref 10–40)
ANION GAP SERPL CALCULATED.3IONS-SCNC: 11 MMOL/L (ref 3–16)
AST SERPL-CCNC: 25 U/L (ref 15–37)
BASOPHILS ABSOLUTE: 0 K/UL (ref 0–0.2)
BASOPHILS RELATIVE PERCENT: 0.4 %
BILIRUB SERPL-MCNC: 0.3 MG/DL (ref 0–1)
BUN BLDV-MCNC: 21 MG/DL (ref 7–20)
CALCIUM SERPL-MCNC: 9.5 MG/DL (ref 8.3–10.6)
CHLORIDE BLD-SCNC: 105 MMOL/L (ref 99–110)
CHOLESTEROL, TOTAL: 118 MG/DL (ref 0–199)
CO2: 25 MMOL/L (ref 21–32)
CREAT SERPL-MCNC: 0.8 MG/DL (ref 0.9–1.3)
EOSINOPHILS ABSOLUTE: 0.1 K/UL (ref 0–0.6)
EOSINOPHILS RELATIVE PERCENT: 1.3 %
GFR AFRICAN AMERICAN: >60
GFR NON-AFRICAN AMERICAN: >60
GLOBULIN: 3.1 G/DL
GLUCOSE BLD-MCNC: 153 MG/DL (ref 70–99)
HCT VFR BLD CALC: 46 % (ref 40.5–52.5)
HDLC SERPL-MCNC: 39 MG/DL (ref 40–60)
HEMOGLOBIN: 15.2 G/DL (ref 13.5–17.5)
LDL CHOLESTEROL CALCULATED: 58 MG/DL
LYMPHOCYTES ABSOLUTE: 2.5 K/UL (ref 1–5.1)
LYMPHOCYTES RELATIVE PERCENT: 35.3 %
MCH RBC QN AUTO: 30.1 PG (ref 26–34)
MCHC RBC AUTO-ENTMCNC: 33 G/DL (ref 31–36)
MCV RBC AUTO: 91.1 FL (ref 80–100)
MONOCYTES ABSOLUTE: 0.8 K/UL (ref 0–1.3)
MONOCYTES RELATIVE PERCENT: 10.7 %
NEUTROPHILS ABSOLUTE: 3.7 K/UL (ref 1.7–7.7)
NEUTROPHILS RELATIVE PERCENT: 52.3 %
PDW BLD-RTO: 13.8 % (ref 12.4–15.4)
PLATELET # BLD: 139 K/UL (ref 135–450)
PMV BLD AUTO: 11.1 FL (ref 5–10.5)
POTASSIUM SERPL-SCNC: 5 MMOL/L (ref 3.5–5.1)
RBC # BLD: 5.05 M/UL (ref 4.2–5.9)
SODIUM BLD-SCNC: 141 MMOL/L (ref 136–145)
TOTAL PROTEIN: 7.3 G/DL (ref 6.4–8.2)
TRIGL SERPL-MCNC: 104 MG/DL (ref 0–150)
VLDLC SERPL CALC-MCNC: 21 MG/DL
WBC # BLD: 7.1 K/UL (ref 4–11)

## 2020-10-11 LAB
ESTIMATED AVERAGE GLUCOSE: 165.7 MG/DL
HBA1C MFR BLD: 7.4 %

## 2020-11-25 ENCOUNTER — TELEPHONE (OUTPATIENT)
Dept: INTERNAL MEDICINE CLINIC | Age: 55
End: 2020-11-25

## 2020-11-25 RX ORDER — SITAGLIPTIN AND METFORMIN HYDROCHLORIDE 100; 1000 MG/1; MG/1
TABLET, FILM COATED, EXTENDED RELEASE ORAL
Qty: 30 TABLET | Refills: 3 | Status: SHIPPED | OUTPATIENT
Start: 2020-11-25 | End: 2021-03-11 | Stop reason: SDUPTHER

## 2020-11-25 NOTE — TELEPHONE ENCOUNTER
Patient wife called stating that she needs to get a prescription faxed to [facility]) for :     Uriel Forth -1000 MG TB24 [[id number]    Fax # [phone]     Please call to advise

## 2020-12-16 DIAGNOSIS — E11.9 TYPE 2 DIABETES MELLITUS WITHOUT COMPLICATION, WITHOUT LONG-TERM CURRENT USE OF INSULIN (HCC): ICD-10-CM

## 2020-12-16 DIAGNOSIS — E78.2 HYPERLIPIDEMIA, MIXED: ICD-10-CM

## 2020-12-16 LAB
A/G RATIO: 1.6 (ref 1.1–2.2)
ALBUMIN SERPL-MCNC: 4.6 G/DL (ref 3.4–5)
ALP BLD-CCNC: 90 U/L (ref 40–129)
ALT SERPL-CCNC: 32 U/L (ref 10–40)
ANION GAP SERPL CALCULATED.3IONS-SCNC: 12 MMOL/L (ref 3–16)
AST SERPL-CCNC: 25 U/L (ref 15–37)
BILIRUB SERPL-MCNC: 0.3 MG/DL (ref 0–1)
BUN BLDV-MCNC: 21 MG/DL (ref 7–20)
CALCIUM SERPL-MCNC: 9.6 MG/DL (ref 8.3–10.6)
CHLORIDE BLD-SCNC: 104 MMOL/L (ref 99–110)
CO2: 26 MMOL/L (ref 21–32)
CREAT SERPL-MCNC: 0.7 MG/DL (ref 0.9–1.3)
ESTIMATED AVERAGE GLUCOSE: 154.2 MG/DL
GFR AFRICAN AMERICAN: >60
GFR NON-AFRICAN AMERICAN: >60
GLOBULIN: 2.9 G/DL
GLUCOSE BLD-MCNC: 189 MG/DL (ref 70–99)
HBA1C MFR BLD: 7 %
POTASSIUM SERPL-SCNC: 5.7 MMOL/L (ref 3.5–5.1)
SODIUM BLD-SCNC: 142 MMOL/L (ref 136–145)
TOTAL PROTEIN: 7.5 G/DL (ref 6.4–8.2)

## 2020-12-18 ENCOUNTER — OFFICE VISIT (OUTPATIENT)
Dept: INTERNAL MEDICINE CLINIC | Age: 55
End: 2020-12-18
Payer: COMMERCIAL

## 2020-12-18 VITALS
BODY MASS INDEX: 37.51 KG/M2 | DIASTOLIC BLOOD PRESSURE: 70 MMHG | TEMPERATURE: 97.8 F | SYSTOLIC BLOOD PRESSURE: 132 MMHG | WEIGHT: 308 LBS | HEIGHT: 76 IN

## 2020-12-18 PROCEDURE — 3051F HG A1C>EQUAL 7.0%<8.0%: CPT | Performed by: INTERNAL MEDICINE

## 2020-12-18 PROCEDURE — 99213 OFFICE O/P EST LOW 20 MIN: CPT | Performed by: INTERNAL MEDICINE

## 2020-12-18 RX ORDER — ERTUGLIFLOZIN 15 MG/1
1 TABLET, FILM COATED ORAL DAILY
Qty: 30 TABLET | Refills: 11 | Status: SHIPPED | OUTPATIENT
Start: 2020-12-18 | End: 2021-10-29 | Stop reason: SDUPTHER

## 2020-12-18 RX ORDER — OMEGA-3S/DHA/EPA/FISH OIL/D3 300MG-1000
400 CAPSULE ORAL DAILY
COMMUNITY

## 2020-12-18 ASSESSMENT — ENCOUNTER SYMPTOMS
RESPIRATORY NEGATIVE: 1
GASTROINTESTINAL NEGATIVE: 1

## 2020-12-18 NOTE — PROGRESS NOTES
SUBJECTIVE:  Patient ID: Robinson Weinberg is an 54 y.o. male. HPI: Patient here today for the f/u of chronic problems-- see Problem List and associated comments. New issues or complaints include (also see Assessment for more details): Doing well during the pandemic. He is working hard on his diet and controlling calories. He is currently taking Jardiance at 1/2 tablet a day and the Janumet. He gets the [place] from [facility]) is considerably cheaper. No signs or symptoms of infection during the pandemic. We will update her obstacles to treatment is cost.    Review of Systems   Constitutional: Negative for fatigue and fever. Eyes: Negative for visual disturbance. Respiratory: Negative. Cardiovascular: Negative. Gastrointestinal: Negative. Genitourinary: Positive for frequency. Negative for difficulty urinating and dysuria. Musculoskeletal: Negative. Neurological: Negative. Negative for numbness. Psychiatric/Behavioral: Negative. OBJECTIVE:    /70   Temp 97.8 °F (36.6 °C)   Ht 6' 4\" (1.93 m)   Wt (!) 308 lb (139.7 kg)   BMI 37.49 kg/m²      Physical Exam  Constitutional:       Appearance: He is obese. Neck:      Vascular: No carotid bruit. Cardiovascular:      Rate and Rhythm: Normal rate and regular rhythm. Pulmonary:      Effort: Pulmonary effort is normal.      Breath sounds: Normal breath sounds. No wheezing or rales. Musculoskeletal:      Right lower leg: No edema. Left lower leg: No edema. Skin:     Coloration: Skin is not pale. Neurological:      General: No focal deficit present. Mental Status: He is alert and oriented to person, place, and time. Psychiatric:         Mood and Affect: Mood normal.         Behavior: Behavior normal.         Thought Content: Thought content normal.         Judgment: Judgment normal.         ASSESSMENT:       Encounter Diagnoses   Name Primary?     Type 2 diabetes mellitus without complication, without long-term current use of insulin ([facility] [address] Yes    Hyperlipidemia, mixed     Thrombocytopenia (HCC)        Diabetes mellitus ([facility] [address]  Improved to 7 by taking Janumet and taking one half Jardiance per day. Expense is an issue. Getting some medications from [place] ([facility]). He did have samples of Jardiance. I gave him samples of Steglatro 15 mg and he will take 1/2 tablet daily and repeat labs in 5 to 6 months. Continue diet and weight loss. Hyperlipidemia, mixed  Tolerating Lipitor twice per week. PLAN:See ASSESSMENT for evaluation & PLAN     Orders Placed This Encounter   Procedures    Comprehensive Metabolic Panel     Standing Status:   Future     Standing Expiration Date:   12/18/2021    Lipid Panel     Standing Status:   Future     Standing Expiration Date:   12/18/2021     Order Specific Question:   Is Patient Fasting?/# of Hours     Answer:   yes - 8 hours    Hemoglobin A1C     Standing Status:   Future     Standing Expiration Date:   12/18/2021    CBC Auto Differential     Standing Status:   Future     Standing Expiration Date:   12/18/2021     , PMH, SH and FH reviewed and noted. Recent and past labs, tests and consultsalso reviewed. Recent or new meds also reviewed.

## 2020-12-18 NOTE — ASSESSMENT & PLAN NOTE
Improved to 7 by taking Janumet and taking one half Jardiance per day. Expense is an issue. Getting some medications from [place] ([facility]). He did have samples of Jardiance. I gave him samples of Steglatro 15 mg and he will take 1/2 tablet daily and repeat labs in 5 to 6 months. Continue diet and weight loss.

## 2021-03-11 RX ORDER — SITAGLIPTIN AND METFORMIN HYDROCHLORIDE 100; 1000 MG/1; MG/1
TABLET, FILM COATED, EXTENDED RELEASE ORAL
Qty: 90 TABLET | Refills: 1 | Status: SHIPPED | OUTPATIENT
Start: 2021-03-11 | End: 2021-03-15 | Stop reason: SDUPTHER

## 2021-03-15 ENCOUNTER — TELEPHONE (OUTPATIENT)
Dept: INTERNAL MEDICINE CLINIC | Age: 56
End: 2021-03-15

## 2021-03-15 RX ORDER — SITAGLIPTIN AND METFORMIN HYDROCHLORIDE 100; 1000 MG/1; MG/1
TABLET, FILM COATED, EXTENDED RELEASE ORAL
Qty: 90 TABLET | Refills: 1 | Status: SHIPPED | OUTPATIENT
Start: 2021-03-15 | End: 2021-06-18 | Stop reason: SDUPTHER

## 2021-05-05 ENCOUNTER — OFFICE VISIT (OUTPATIENT)
Dept: INTERNAL MEDICINE CLINIC | Age: 56
End: 2021-05-05
Payer: COMMERCIAL

## 2021-05-05 VITALS
WEIGHT: 315 LBS | HEART RATE: 82 BPM | HEIGHT: 77 IN | SYSTOLIC BLOOD PRESSURE: 160 MMHG | TEMPERATURE: 99.1 F | OXYGEN SATURATION: 98 % | DIASTOLIC BLOOD PRESSURE: 82 MMHG | BODY MASS INDEX: 37.19 KG/M2

## 2021-05-05 DIAGNOSIS — B37.0 THRUSH: Primary | ICD-10-CM

## 2021-05-05 PROCEDURE — 99213 OFFICE O/P EST LOW 20 MIN: CPT | Performed by: INTERNAL MEDICINE

## 2021-05-05 RX ORDER — FLUCONAZOLE 100 MG/1
100 TABLET ORAL DAILY
Qty: 7 TABLET | Refills: 0 | Status: SHIPPED | OUTPATIENT
Start: 2021-05-05 | End: 2021-05-12

## 2021-05-05 ASSESSMENT — ENCOUNTER SYMPTOMS
BACK PAIN: 0
SHORTNESS OF BREATH: 0
ABDOMINAL PAIN: 0
EYE REDNESS: 0
CHEST TIGHTNESS: 0
NAUSEA: 0

## 2021-05-05 ASSESSMENT — PATIENT HEALTH QUESTIONNAIRE - PHQ9
1. LITTLE INTEREST OR PLEASURE IN DOING THINGS: 0
SUM OF ALL RESPONSES TO PHQ9 QUESTIONS 1 & 2: 0
SUM OF ALL RESPONSES TO PHQ QUESTIONS 1-9: 0
2. FEELING DOWN, DEPRESSED OR HOPELESS: 0
SUM OF ALL RESPONSES TO PHQ QUESTIONS 1-9: 0

## 2021-05-05 NOTE — PROGRESS NOTES
Subjective:      Patient ID: Anshul Witt is a 54 y.o. male    Chief Complaint   Patient presents with    Other     painful, gum swelling, white spots  sx: Saturday       HPI     Mouth pain  Shiela Wolfe has noticed inflamed mouth and gums for the last 5 days. In the past he has had thrush because he is diabetic. The soreness has been improving since he has been rinsing his mouth with salt water. He denies any recent antibiotic treatment. He has taken some Tylenol for pain. His blood sugars have been stable in the 100-1 30 range. He denies any sore teeth. Current Outpatient Medications on File Prior to Visit   Medication Sig Dispense Refill    SITagliptin-metFORMIN HCl ER (JANUMET XR) 100-1000 MG TB24 TAKE 1 TABLET BY MOUTH ONE TIME A DAY 90 tablet 1    Zinc Sulfate (ZINC 15 PO) Take by mouth      vitamin D3 (CHOLECALCIFEROL) 10 MCG (400 UNIT) TABS tablet Take 400 Units by mouth daily      Ertugliflozin L-PyroglutamicAc (STEGLATRO) 15 MG TABS Take 1 tablet by mouth daily 30 tablet 11    empagliflozin (JARDIANCE) 25 MG tablet Take 1 tablet by mouth daily 90 tablet 3    atorvastatin (LIPITOR) 10 MG tablet TAKE 1 TABLET BY MOUTH ONE TIME A DAY (NEED APPT. FOR FURTHER REFILL) (Patient taking differently: Two times a week) 30 tablet 5     No current facility-administered medications on file prior to visit. No Known Allergies    Review of Systems   Constitutional: Negative for fatigue, fever and unexpected weight change. HENT: Negative for hearing loss. Sore mouth and gums   Eyes: Negative for redness and visual disturbance. Respiratory: Negative for chest tightness and shortness of breath. Cardiovascular: Negative for chest pain and palpitations. Gastrointestinal: Negative for abdominal pain and nausea. Endocrine: Negative for polydipsia and polyuria. Genitourinary: Negative for dysuria and hematuria. Musculoskeletal: Negative for arthralgias, back pain and neck pain.    Skin: Negative for rash and wound. Neurological: Negative for dizziness and headaches. Hematological: Negative for adenopathy. Does not bruise/bleed easily. Psychiatric/Behavioral: Negative for agitation. The patient is not nervous/anxious. Objective:   Physical Exam  Constitutional:       Appearance: Normal appearance. HENT:      Mouth/Throat:      Comments: Moderate oral and buccal erythema , a couple white patches, no definite ulcers , no exudate   Eyes:      Pupils: Pupils are equal, round, and reactive to light. Cardiovascular:      Rate and Rhythm: Normal rate and regular rhythm. Pulmonary:      Effort: Pulmonary effort is normal.      Breath sounds: Normal breath sounds. Neurological:      General: No focal deficit present. Mental Status: He is alert and oriented to person, place, and time. Psychiatric:         Mood and Affect: Mood normal.         Assessment and plan       1. Thrush  Acute mouth soreness. White patches in the oral and buccal mucosa. Possible thrush. Start on Diflucan. - fluconazole (DIFLUCAN) 100 MG tablet; Take 1 tablet by mouth daily for 7 days  Dispense: 7 tablet;  Refill: 0

## 2021-06-12 DIAGNOSIS — D69.6 THROMBOCYTOPENIA (HCC): ICD-10-CM

## 2021-06-12 DIAGNOSIS — E78.2 HYPERLIPIDEMIA, MIXED: ICD-10-CM

## 2021-06-12 DIAGNOSIS — E11.9 TYPE 2 DIABETES MELLITUS WITHOUT COMPLICATION, WITHOUT LONG-TERM CURRENT USE OF INSULIN (HCC): ICD-10-CM

## 2021-06-12 LAB
A/G RATIO: 1.3 (ref 1.1–2.2)
ALBUMIN SERPL-MCNC: 4.4 G/DL (ref 3.4–5)
ALP BLD-CCNC: 87 U/L (ref 40–129)
ALT SERPL-CCNC: 28 U/L (ref 10–40)
ANION GAP SERPL CALCULATED.3IONS-SCNC: 11 MMOL/L (ref 3–16)
AST SERPL-CCNC: 32 U/L (ref 15–37)
BASOPHILS ABSOLUTE: 0 K/UL (ref 0–0.2)
BASOPHILS RELATIVE PERCENT: 0.4 %
BILIRUB SERPL-MCNC: 0.3 MG/DL (ref 0–1)
BUN BLDV-MCNC: 19 MG/DL (ref 7–20)
CALCIUM SERPL-MCNC: 9.5 MG/DL (ref 8.3–10.6)
CHLORIDE BLD-SCNC: 101 MMOL/L (ref 99–110)
CHOLESTEROL, TOTAL: 144 MG/DL (ref 0–199)
CO2: 26 MMOL/L (ref 21–32)
CREAT SERPL-MCNC: 0.8 MG/DL (ref 0.9–1.3)
EOSINOPHILS ABSOLUTE: 0.1 K/UL (ref 0–0.6)
EOSINOPHILS RELATIVE PERCENT: 1.3 %
GFR AFRICAN AMERICAN: >60
GFR NON-AFRICAN AMERICAN: >60
GLOBULIN: 3.4 G/DL
GLUCOSE BLD-MCNC: 171 MG/DL (ref 70–99)
HCT VFR BLD CALC: 45.9 % (ref 40.5–52.5)
HDLC SERPL-MCNC: 36 MG/DL (ref 40–60)
HEMOGLOBIN: 15.7 G/DL (ref 13.5–17.5)
LDL CHOLESTEROL CALCULATED: 81 MG/DL
LYMPHOCYTES ABSOLUTE: 2.8 K/UL (ref 1–5.1)
LYMPHOCYTES RELATIVE PERCENT: 42.7 %
MCH RBC QN AUTO: 31 PG (ref 26–34)
MCHC RBC AUTO-ENTMCNC: 34.2 G/DL (ref 31–36)
MCV RBC AUTO: 90.8 FL (ref 80–100)
MONOCYTES ABSOLUTE: 0.7 K/UL (ref 0–1.3)
MONOCYTES RELATIVE PERCENT: 11.1 %
NEUTROPHILS ABSOLUTE: 3 K/UL (ref 1.7–7.7)
NEUTROPHILS RELATIVE PERCENT: 44.5 %
PDW BLD-RTO: 13.7 % (ref 12.4–15.4)
PLATELET # BLD: 119 K/UL (ref 135–450)
PMV BLD AUTO: 9.9 FL (ref 5–10.5)
POTASSIUM SERPL-SCNC: 5.4 MMOL/L (ref 3.5–5.1)
RBC # BLD: 5.06 M/UL (ref 4.2–5.9)
SODIUM BLD-SCNC: 138 MMOL/L (ref 136–145)
TOTAL PROTEIN: 7.8 G/DL (ref 6.4–8.2)
TRIGL SERPL-MCNC: 133 MG/DL (ref 0–150)
VLDLC SERPL CALC-MCNC: 27 MG/DL
WBC # BLD: 6.7 K/UL (ref 4–11)

## 2021-06-13 LAB
ESTIMATED AVERAGE GLUCOSE: 162.8 MG/DL
HBA1C MFR BLD: 7.3 %

## 2021-06-18 ENCOUNTER — OFFICE VISIT (OUTPATIENT)
Dept: INTERNAL MEDICINE CLINIC | Age: 56
End: 2021-06-18
Payer: MEDICARE

## 2021-06-18 VITALS
HEIGHT: 76 IN | SYSTOLIC BLOOD PRESSURE: 126 MMHG | DIASTOLIC BLOOD PRESSURE: 70 MMHG | WEIGHT: 304 LBS | BODY MASS INDEX: 37.02 KG/M2

## 2021-06-18 DIAGNOSIS — E78.2 HYPERLIPIDEMIA, MIXED: ICD-10-CM

## 2021-06-18 DIAGNOSIS — Z00.00 PREVENTATIVE HEALTH CARE: ICD-10-CM

## 2021-06-18 DIAGNOSIS — E11.9 TYPE 2 DIABETES MELLITUS WITHOUT COMPLICATION, WITHOUT LONG-TERM CURRENT USE OF INSULIN (HCC): ICD-10-CM

## 2021-06-18 DIAGNOSIS — L02.232 CARBUNCLE OF BACK: ICD-10-CM

## 2021-06-18 DIAGNOSIS — G56.01 CARPAL TUNNEL SYNDROME ON RIGHT: ICD-10-CM

## 2021-06-18 PROCEDURE — 99396 PREV VISIT EST AGE 40-64: CPT | Performed by: INTERNAL MEDICINE

## 2021-06-18 RX ORDER — SITAGLIPTIN AND METFORMIN HYDROCHLORIDE 100; 1000 MG/1; MG/1
TABLET, FILM COATED, EXTENDED RELEASE ORAL
Qty: 90 TABLET | Refills: 3 | Status: SHIPPED | OUTPATIENT
Start: 2021-06-18 | End: 2021-11-05 | Stop reason: SDUPTHER

## 2021-06-18 RX ORDER — ATORVASTATIN CALCIUM 10 MG/1
10 TABLET, FILM COATED ORAL DAILY
Qty: 90 TABLET | Refills: 3 | Status: SHIPPED | OUTPATIENT
Start: 2021-06-18 | End: 2022-11-04 | Stop reason: SDUPTHER

## 2021-06-18 RX ORDER — CHLORAL HYDRATE 500 MG
1000 CAPSULE ORAL DAILY
COMMUNITY

## 2021-06-18 RX ORDER — MULTIVIT WITH MINERALS/LUTEIN
250 TABLET ORAL DAILY
COMMUNITY

## 2021-06-18 ASSESSMENT — PATIENT HEALTH QUESTIONNAIRE - PHQ9
SUM OF ALL RESPONSES TO PHQ QUESTIONS 1-9: 0
SUM OF ALL RESPONSES TO PHQ9 QUESTIONS 1 & 2: 0
SUM OF ALL RESPONSES TO PHQ QUESTIONS 1-9: 0
1. LITTLE INTEREST OR PLEASURE IN DOING THINGS: 0
2. FEELING DOWN, DEPRESSED OR HOPELESS: 0
1. LITTLE INTEREST OR PLEASURE IN DOING THINGS: 0
SUM OF ALL RESPONSES TO PHQ QUESTIONS 1-9: 0
SUM OF ALL RESPONSES TO PHQ9 QUESTIONS 1 & 2: 0
SUM OF ALL RESPONSES TO PHQ QUESTIONS 1-9: 0
2. FEELING DOWN, DEPRESSED OR HOPELESS: 0

## 2021-06-18 ASSESSMENT — ENCOUNTER SYMPTOMS
RESPIRATORY NEGATIVE: 1
GASTROINTESTINAL NEGATIVE: 1

## 2021-06-18 ASSESSMENT — SOCIAL DETERMINANTS OF HEALTH (SDOH): HOW HARD IS IT FOR YOU TO PAY FOR THE VERY BASICS LIKE FOOD, HOUSING, MEDICAL CARE, AND HEATING?: NOT HARD AT ALL

## 2021-06-18 NOTE — PROGRESS NOTES
SUBJECTIVE:  Patient ID: Sukhdev Delacruz is an 64 y.o. male. HPI: Patient here today for the f/u of chronic problems-- see Problem List and associated comments. New issues or complaints include (also see Assessment for more details): Here for checkup. Labs reviewed. He has not had he had Covid vaccination. His daughter got  which preoccupied most of the springtime. He is still working full-time as a beckwith. Review of Systems   Constitutional: Negative. HENT: Negative. Eyes: Negative for visual disturbance. Respiratory: Negative. Cardiovascular: Negative. Gastrointestinal: Negative. Genitourinary: Negative. Allergic/Immunologic: Negative for immunocompromised state. Neurological: Positive for numbness. Negative for weakness. Psychiatric/Behavioral: Negative. OBJECTIVE:    /70 (Site: Right Upper Arm)   Ht 6' 4\" (1.93 m)   Wt (!) 304 lb (137.9 kg)   BMI 37.00 kg/m²      Physical Exam  Constitutional:       General: He is not in acute distress. Appearance: He is well-developed. He is obese. He is not diaphoretic. Comments: overweight    HENT:      Head: Normocephalic and atraumatic. Right Ear: Ear canal and external ear normal.      Left Ear: Ear canal and external ear normal.      Nose: Nose normal.      Mouth/Throat:      Pharynx: No oropharyngeal exudate. Eyes:      General:         Right eye: No discharge. Left eye: No discharge. Extraocular Movements: Extraocular movements intact. Pupils: Pupils are equal, round, and reactive to light. Comments: Fundi okay   Neck:      Thyroid: No thyromegaly. Vascular: No carotid bruit or JVD. Trachea: No tracheal deviation. Cardiovascular:      Rate and Rhythm: Normal rate and regular rhythm. Pulses:           Carotid pulses are 2+ on the right side and 2+ on the left side. Radial pulses are 2+ on the right side and 2+ on the left side.         Posterior tibial pulses are 2+ on the right side and 2+ on the left side. Heart sounds: Normal heart sounds. No murmur heard. No gallop. Comments: Varicose veins left lower extremity  Pulmonary:      Effort: Pulmonary effort is normal. No respiratory distress. Breath sounds: Normal breath sounds. No stridor. No wheezing or rales. Abdominal:      General: Bowel sounds are normal. There is no distension or abdominal bruit. Palpations: Abdomen is soft. There is no hepatomegaly, splenomegaly, mass or pulsatile mass. Tenderness: There is no abdominal tenderness. Genitourinary:     Penis: Circumcised. Testes:         Right: Mass not present. Left: Mass not present. Musculoskeletal:      Cervical back: Normal range of motion and neck supple. No rigidity. Right lower leg: No edema. Left lower leg: No edema. Lymphadenopathy:      Head:      Right side of head: No submandibular adenopathy. Left side of head: No submandibular adenopathy. Cervical: No cervical adenopathy. Upper Body:      Right upper body: No supraclavicular adenopathy. Left upper body: No supraclavicular adenopathy. Lower Body: No right inguinal adenopathy. No left inguinal adenopathy. Skin:     Coloration: Skin is not jaundiced or pale. Comments: Small sebaceous type cyst on back-noninfected   Neurological:      General: No focal deficit present. Mental Status: He is alert and oriented to person, place, and time. Cranial Nerves: No cranial nerve deficit. Motor: No tremor or abnormal muscle tone. Coordination: Coordination normal.      Gait: Gait normal.      Deep Tendon Reflexes:      Reflex Scores:       Bicep reflexes are 1+ on the right side and 1+ on the left side. Patellar reflexes are 1+ on the right side and 1+ on the left side.      Comments: No focal signs    Mildly positive Tinel's right wrist   Psychiatric:         Attention and Perception:

## 2021-06-18 NOTE — ASSESSMENT & PLAN NOTE
routine checkup-labs reviewed. He will schedule Covid vaccination at some point. His daughter recently got  which preoccupied the entire spring. He continues to lose weight slowly    Due for colonoscopy this year.

## 2021-06-18 NOTE — ASSESSMENT & PLAN NOTE
symptoms of right hand compatible with carpal tunnel. I do not believe that these are resulting from pressure from his carbuncles on his back-as stated by his chiropractor.   He will try a wrist splint and at some point may need an EMG and hand orthopedic referral.

## 2021-06-18 NOTE — ASSESSMENT & PLAN NOTE
A1c increased slightly to 7.3. Gave samples of Steglatro 15 mg-we will continue on one half daily. Continue Janumet. Labs in 4 months. Continue weight loss.

## 2021-06-25 ENCOUNTER — TELEPHONE (OUTPATIENT)
Dept: SURGERY | Age: 56
End: 2021-06-25

## 2021-06-25 NOTE — TELEPHONE ENCOUNTER
Patients wife would like to know if she can schedule a procedure to remove the membrane of cyst.      Please call patient at [phone]

## 2021-06-28 NOTE — TELEPHONE ENCOUNTER
Returned call to patient's wife. Patient is not an established patient with Dr Isatu Mcnally. Last saw Dr Debora Martinez 8/16/16 for same concern. Offered an appointment with Dr Samantha Mireles on 7/6/21. Patient's wife is worried that is too far away of an appointment, patient's back is red and warm and irritated. Offered an appt with Dr Aracelis Parikh on Friday 7/2/21. Appt made.  DONE

## 2021-07-02 ENCOUNTER — OFFICE VISIT (OUTPATIENT)
Dept: SURGERY | Age: 56
End: 2021-07-02

## 2021-07-02 VITALS
DIASTOLIC BLOOD PRESSURE: 88 MMHG | TEMPERATURE: 97.8 F | SYSTOLIC BLOOD PRESSURE: 145 MMHG | WEIGHT: 314 LBS | OXYGEN SATURATION: 96 % | BODY MASS INDEX: 38.24 KG/M2 | HEIGHT: 76 IN | HEART RATE: 78 BPM

## 2021-07-02 DIAGNOSIS — L72.0 EIC (EPIDERMAL INCLUSION CYST): Primary | ICD-10-CM

## 2021-07-02 PROCEDURE — 99203 OFFICE O/P NEW LOW 30 MIN: CPT | Performed by: SURGERY

## 2021-07-02 PROCEDURE — 11402 EXC TR-EXT B9+MARG 1.1-2 CM: CPT | Performed by: SURGERY

## 2021-07-02 RX ORDER — AMOXICILLIN AND CLAVULANATE POTASSIUM 875; 125 MG/1; MG/1
1 TABLET, FILM COATED ORAL 2 TIMES DAILY
Qty: 14 TABLET | Refills: 0 | Status: SHIPPED | OUTPATIENT
Start: 2021-07-02 | End: 2021-07-09

## 2021-07-02 NOTE — PROGRESS NOTES
Subjective:     Patient is a 64 y.o. man with infected epidermal inclusion cyst     HPI: Mr. Leila Vasques reports a prior back abscess lanced by my partner Dr. Lilli Castillo a few years ago (2016). He now has pain, swelling and redness at the site. He did not want to wait until my partner was available so came into today for treatment. He sees Dr. Maricruz Herrera for colonoscopies. Patient Active Problem List    Diagnosis Date Noted    Carbuncle of back 06/18/2021    Carpal tunnel syndrome on right 06/18/2021    Thrombocytopenia ([address] 05/29/2020    Hyperlipidemia, mixed 05/29/2020    Preventative health care 07/11/2017    Diabetes mellitus ([address] 10/24/2013     Past Medical History:   Diagnosis Date    Diabetes ([address]     Hyperlipidemia       Past Surgical History:   Procedure Laterality Date    COLONOSCOPY  2018    Recheck 3 years    FINGER SURGERY      left index finger tip off - repaired    KNEE ARTHROSCOPY      right     NOSE SURGERY      tip of nose cut off - repaired      Not in a hospital admission. No Known Allergies   Social History     Tobacco Use    Smoking status: Never Smoker    Smokeless tobacco: Never Used   Substance Use Topics    Alcohol use: No      Family History   Problem Relation Age of Onset    Cancer Father         lymphoma,     Diabetes Father         gout    High Blood Pressure Mother       Review of Systems    GEN: reviewed and negative except as noted in HPI. SKIN: reviewed and negative except as noted in HPI. Objective:     GEN: appears well, no distress, appears stated age  PSYCH: normal mood, normal affect  NECK: no neck masses, trachea midline  ENT: moist oral mucosa; anicteric  SKIN: no rash or jaundice. Back cyst measures 1.5 cm. Red and swollen. There is a separate lesion about two cm medial that may be residual from the original cyst. This is not inflamed. Two other 1-2 cm cyst in the mid back and neck not inflamed.    CV: regular heart rate and rhythm  PULM: normal respiratory effort, no wheezing  GI: soft non tender abdomen. Normal bowel sounds  RECTAL: deferred   EXT/NEURO: normal gait, strength/sensation grossly intact in all extremities    Dr. Almendarez Even note from 2016 reviewed and described a 7 cm abscess that was lanced     Assessment:     Infected epidermal cyst      Plan:     Risks, benefits, alternatives discussed with the patient of drainage of cyst/abscess  Benefit is resolution of infection  Risks include bleeding, recurrence, prolonged wound healing  Alternative is antibiotics but given nature of recurrent abscess surgical drainage is recommended  Patient elects to proceed    Madison Dangelo M.D.  7/2/21   10:15 AM    After informed consent was obtained the patient was taken to the clinic room. The patient was then prepped and draped in the usual sterile fashion. Local anesthesia was given. Time out was called to confirm key components. The patient was placed in the prone position with appropriate padding. We saw a 1.5 cm inflamed cyst in the right back and a smaller lesion just next to it. These were superficial and far from any critical neurovascular structures. We excised both cysts using a #15 scalpel and scissors. Good hemostasis was seen. Cyst fragments and debris were consistent with inclusion cyst. No residual cyst seen at end. Wound irrigated and closed with interrupted Nylon. Sterile dressing applied. Wound care discussed. We had prepped and draped the other two cysts which were much further away. Given the discoloration and swelling at the other cyst site we decided to leave these alone to decrease change of spreading any possible infection to other areas. Discussed we can remove these at next visit if the right side heals. See me in 10-14 days. Fragments not sent for pathology given the appearance of benign cyst.    Dr. Peace Gee performed all portions.  There were no qualified residents available to assist. Given his prior infections a script for antibiotics was given. The patient and his wife were instructed to fill this if any redness or drainage develops.      Christopher Sin M.D.  7/2/21   11:20 AM

## 2021-07-16 ENCOUNTER — OFFICE VISIT (OUTPATIENT)
Dept: SURGERY | Age: 56
End: 2021-07-16

## 2021-07-16 VITALS
HEIGHT: 76 IN | SYSTOLIC BLOOD PRESSURE: 148 MMHG | OXYGEN SATURATION: 98 % | TEMPERATURE: 97.3 F | DIASTOLIC BLOOD PRESSURE: 82 MMHG | HEART RATE: 69 BPM | BODY MASS INDEX: 37.51 KG/M2 | WEIGHT: 308 LBS

## 2021-07-16 DIAGNOSIS — L72.0 EIC (EPIDERMAL INCLUSION CYST): Primary | ICD-10-CM

## 2021-07-16 PROCEDURE — 11401 EXC TR-EXT B9+MARG 0.6-1 CM: CPT | Performed by: SURGERY

## 2021-07-16 PROCEDURE — 11402 EXC TR-EXT B9+MARG 1.1-2 CM: CPT | Performed by: SURGERY

## 2021-07-16 PROCEDURE — 99212 OFFICE O/P EST SF 10 MIN: CPT | Performed by: SURGERY

## 2021-07-16 RX ORDER — AMOXICILLIN AND CLAVULANATE POTASSIUM 875; 125 MG/1; MG/1
1 TABLET, FILM COATED ORAL 2 TIMES DAILY
Qty: 14 TABLET | Refills: 0 | Status: SHIPPED | OUTPATIENT
Start: 2021-07-16 | End: 2021-07-23

## 2021-07-16 NOTE — PROGRESS NOTES
Subjective:     Patient is a 64 y.o. man with skin cysts     HPI: Mr. Gosia Gutiérrez returns for treatment of his remaining two inclusion cysts. The other areas have healed without any problems. Patient Active Problem List    Diagnosis Date Noted    Carbuncle of back 06/18/2021    Carpal tunnel syndrome on right 06/18/2021    Thrombocytopenia ([address] 05/29/2020    Hyperlipidemia, mixed 05/29/2020    Preventative health care 07/11/2017    Diabetes mellitus ([address] 10/24/2013     Past Medical History:   Diagnosis Date    [facility]     Hyperlipidemia       Past Surgical History:   Procedure Laterality Date    COLONOSCOPY  2018    Recheck 3 years    FINGER SURGERY      left index finger tip off - repaired    KNEE ARTHROSCOPY      right     NOSE SURGERY      tip of nose cut off - repaired      Not in a hospital admission. No Known Allergies   Social History     Tobacco Use    Smoking status: Never Smoker    Smokeless tobacco: Never Used   Substance Use Topics    Alcohol use: No      Family History   Problem Relation Age of Onset    Cancer Father         lymphoma,     Diabetes Father         gout    High Blood Pressure Mother       Review of Systems    GEN: reviewed and negative except as noted in HPI. GI: reviewed and negative except as noted in HPI. Objective:     GEN: appears well, no distress, appears stated age  PSYCH: normal mood, normal affect  NECK: no neck masses, trachea midline  ENT: moist oral mucosa; anicteric  SKIN: no rash or jaundice. Skin cysts mid back. Other areas healed   CV: regular heart rate and rhythm  PULM: normal respiratory effort, no wheezing  GI: soft non tender abdomen. Normal bowel sounds  RECTAL: deferred   EXT/NEURO: normal gait, strength/sensation grossly intact in all extremities      Assessment:     Skin cysts     Plan:     RBA of removal discussed.  Patient elects to have them removed    Jin Oliveros M.D.  7/16/21   9:38 AM    After informed consent was obtained the patient was taken to the clinic room. The patient was then prepped and draped in the usual sterile fashion. Local anesthesia was given. Time out was called to confirm key components. The patient was placed in the prone position with appropriate padding. We saw a 1 cm cyst in the mid back. This was superficial and far from any critical neurovascular structures. We excised the cyst using a #15 scalpel and scissors. Good hemostasis was seen. Cyst fragments and debris were consistent with inclusion cyst. No residual cyst seen at end. Wound irrigated and closed with interrupted subcuticular monocryl. Sterile dressing applied. Wound care discussed. A separate 1.5 cm cyst of the right upper shoulder was removed in similar fashion. See me as needed. Fragments not sent for pathology given benign appearance and small size. Dr. Timmy Maki performed all portions. There were no qualified residents available to assist. Following closure and dressing of his skin the sutures from his last cyst removal were removed.      Juan José Campbell M.D.  7/16/21   10:32 AM

## 2021-07-18 PROBLEM — Z00.00 PREVENTATIVE HEALTH CARE: Status: RESOLVED | Noted: 2017-07-11 | Resolved: 2021-07-18

## 2021-10-22 DIAGNOSIS — E11.9 TYPE 2 DIABETES MELLITUS WITHOUT COMPLICATION, WITHOUT LONG-TERM CURRENT USE OF INSULIN (HCC): Primary | ICD-10-CM

## 2021-10-22 DIAGNOSIS — E78.2 HYPERLIPIDEMIA, MIXED: ICD-10-CM

## 2021-10-29 ENCOUNTER — OFFICE VISIT (OUTPATIENT)
Dept: INTERNAL MEDICINE CLINIC | Age: 56
End: 2021-10-29
Payer: COMMERCIAL

## 2021-10-29 VITALS
WEIGHT: 298 LBS | HEIGHT: 76 IN | HEART RATE: 68 BPM | SYSTOLIC BLOOD PRESSURE: 130 MMHG | DIASTOLIC BLOOD PRESSURE: 78 MMHG | TEMPERATURE: 98.2 F | OXYGEN SATURATION: 97 % | BODY MASS INDEX: 36.29 KG/M2

## 2021-10-29 DIAGNOSIS — E11.9 TYPE 2 DIABETES MELLITUS WITHOUT COMPLICATION, WITHOUT LONG-TERM CURRENT USE OF INSULIN (HCC): ICD-10-CM

## 2021-10-29 DIAGNOSIS — G56.01 CARPAL TUNNEL SYNDROME ON RIGHT: ICD-10-CM

## 2021-10-29 DIAGNOSIS — L02.232 CARBUNCLE OF BACK: ICD-10-CM

## 2021-10-29 DIAGNOSIS — E78.2 HYPERLIPIDEMIA, MIXED: ICD-10-CM

## 2021-10-29 DIAGNOSIS — R74.01 ELEVATED TRANSAMINASE LEVEL: ICD-10-CM

## 2021-10-29 PROCEDURE — 99213 OFFICE O/P EST LOW 20 MIN: CPT | Performed by: INTERNAL MEDICINE

## 2021-10-29 RX ORDER — ERTUGLIFLOZIN 15 MG/1
1 TABLET, FILM COATED ORAL DAILY
Qty: 90 TABLET | Refills: 3 | Status: ON HOLD | OUTPATIENT
Start: 2021-10-29 | End: 2021-12-17 | Stop reason: HOSPADM

## 2021-10-29 RX ORDER — LEVOCETIRIZINE DIHYDROCHLORIDE 5 MG/1
5 TABLET, FILM COATED ORAL NIGHTLY
COMMUNITY
End: 2022-04-29

## 2021-10-29 SDOH — ECONOMIC STABILITY: FOOD INSECURITY: WITHIN THE PAST 12 MONTHS, THE FOOD YOU BOUGHT JUST DIDN'T LAST AND YOU DIDN'T HAVE MONEY TO GET MORE.: NEVER TRUE

## 2021-10-29 SDOH — ECONOMIC STABILITY: FOOD INSECURITY: WITHIN THE PAST 12 MONTHS, YOU WORRIED THAT YOUR FOOD WOULD RUN OUT BEFORE YOU GOT MONEY TO BUY MORE.: NEVER TRUE

## 2021-10-29 ASSESSMENT — SOCIAL DETERMINANTS OF HEALTH (SDOH): HOW HARD IS IT FOR YOU TO PAY FOR THE VERY BASICS LIKE FOOD, HOUSING, MEDICAL CARE, AND HEATING?: NOT HARD AT ALL

## 2021-10-29 ASSESSMENT — ENCOUNTER SYMPTOMS
RESPIRATORY NEGATIVE: 1
GASTROINTESTINAL NEGATIVE: 1

## 2021-10-29 NOTE — ASSESSMENT & PLAN NOTE
A1c improved to 6.9. Noted weight loss. Doing well with SGLT2 inhibitorgave samples of Steglatro. Gave Rx for the 15 mg and will continue taking a half daily. They will check on [facility] for prices.

## 2021-10-29 NOTE — PROGRESS NOTES
SUBJECTIVE:  Patient ID: Jose M Corral is an 64 y.o. male. HPI: Patient here today for the f/u of chronic problems-- see Problem List and associated comments. New issues or complaints include (also see Assessment for more details): Here for follow-up on labs. He feels well. Compliant with medications. Declines Covid vaccination and flu shot. Review of Systems   Constitutional: Negative for fever. Respiratory: Negative. Cardiovascular: Negative. Gastrointestinal: Negative. Genitourinary: Negative. Neurological: Positive for numbness. Psychiatric/Behavioral: Negative. OBJECTIVE:    /78 (Site: Left Upper Arm, Position: Sitting, Cuff Size: Large Adult)   Pulse 68   Temp 98.2 °F (36.8 °C) (Temporal)   Ht 6' 4\" (1.93 m)   Wt 298 lb (135.2 kg)   SpO2 97%   BMI 36.27 kg/m²      Physical Exam  Constitutional:       Comments: Overweight   Neck:      Vascular: No carotid bruit. Cardiovascular:      Rate and Rhythm: Normal rate and regular rhythm. Pulmonary:      Effort: Pulmonary effort is normal.      Breath sounds: Normal breath sounds. No wheezing or rales. Abdominal:      General: Bowel sounds are normal.      Palpations: Abdomen is soft. There is no mass. Tenderness: There is no abdominal tenderness. Musculoskeletal:      Right lower leg: No edema. Left lower leg: No edema. Skin:     Coloration: Skin is not pale. Neurological:      General: No focal deficit present. Mental Status: He is alert and oriented to person, place, and time. Psychiatric:         Mood and Affect: Mood normal.         Behavior: Behavior normal.         Thought Content: Thought content normal.         Judgment: Judgment normal.         ASSESSMENT:       Encounter Diagnoses   Name Primary?     Carbuncle of back     Hyperlipidemia, mixed     Type 2 diabetes mellitus without complication, without long-term current use of insulin (HCC)     Carpal tunnel syndrome on right     Elevated transaminase level        Carbuncle of back    status post excision    Hyperlipidemia, mixed    continue Lipitor    Diabetes mellitus (HCC)    A1c improved to 6.9. Noted weight loss. Doing well with SGLT2 inhibitorgave samples of Steglatro. Gave Rx for the 15 mg and will continue taking a half daily. They will check on [facility] for prices. Carpal tunnel syndrome on right    mild symptomscontrolled with wrist brace. To surgeon if it progresses. Elevated transaminase level    mild elevation of ALT and AST. Could be from his obesity/fatty liver or medication. Asymptomatic. Continue to monitor. May need evaluation if worsens. PLAN:See ASSESSMENT for evaluation & PLAN     No orders of the defined types were placed in this encounter.    , PMH, SH and FH reviewed and noted. Recent and past labs, tests and consultsalso reviewed. Recent or new meds also reviewed.

## 2021-10-29 NOTE — ASSESSMENT & PLAN NOTE
mild elevation of ALT and AST. Could be from his obesity/fatty liver or medication. Asymptomatic. Continue to monitor. May need evaluation if worsens.

## 2021-11-05 RX ORDER — SITAGLIPTIN AND METFORMIN HYDROCHLORIDE 100; 1000 MG/1; MG/1
TABLET, FILM COATED, EXTENDED RELEASE ORAL
Qty: 90 TABLET | Refills: 3 | Status: SHIPPED | OUTPATIENT
Start: 2021-11-05 | End: 2021-12-23 | Stop reason: SDUPTHER

## 2021-12-08 RX ORDER — AMOXICILLIN 500 MG/1
500 CAPSULE ORAL 3 TIMES DAILY
Qty: 30 CAPSULE | Refills: 0 | Status: ON HOLD | OUTPATIENT
Start: 2021-12-08 | End: 2021-12-17 | Stop reason: HOSPADM

## 2021-12-08 NOTE — TELEPHONE ENCOUNTER
Pt's wife called into the office to request a medication for a sinus infection for the patient . He has been experiencing the symptoms since 12/2. [address] [facility] [address], [address] JOSEPH [address] [place] [address] [phone]      Please advise and call.

## 2021-12-13 NOTE — TELEPHONE ENCOUNTER
Patients wife called, they still have not heard back. She is very concerned because the gentleman her  caught covid from, also has double pneumonia. Her  is also a diabetic. Oxygen level is running at 92.       Please call and advise asap

## 2021-12-13 NOTE — TELEPHONE ENCOUNTER
Pt's wife called into the office to request a stronger antibiotic and how she should be handling his sugar given that he has not been eating much. Pt was diagnoses with COVID and has been having trouble breathing. Pt has had a oxygen level of 90-93. Pt is currently on:   Capmist DM   Benzonatate 100 mg      [address] [place] [address] [address] - [facility], [place] [address] - P [phone] - F [phone]    Please call and advise.

## 2021-12-14 ENCOUNTER — HOSPITAL ENCOUNTER (INPATIENT)
Age: 56
LOS: 3 days | Discharge: HOME | DRG: 177 | End: 2021-12-17
Attending: STUDENT IN AN ORGANIZED HEALTH CARE EDUCATION/TRAINING PROGRAM | Admitting: INTERNAL MEDICINE
Payer: COMMERCIAL

## 2021-12-14 ENCOUNTER — APPOINTMENT (OUTPATIENT)
Dept: GENERAL RADIOLOGY | Age: 56
DRG: 177 | End: 2021-12-14
Payer: COMMERCIAL

## 2021-12-14 DIAGNOSIS — U07.1 COVID-19: ICD-10-CM

## 2021-12-14 DIAGNOSIS — J96.01 ACUTE RESPIRATORY FAILURE WITH HYPOXIA (HCC): Primary | ICD-10-CM

## 2021-12-14 PROBLEM — J12.82 PNEUMONIA DUE TO COVID-19 VIRUS: Status: ACTIVE | Noted: 2021-12-14

## 2021-12-14 LAB
A/G RATIO: 0.8 (ref 1.1–2.2)
ALBUMIN SERPL-MCNC: 3.3 G/DL (ref 3.4–5)
ALP BLD-CCNC: 62 U/L (ref 40–129)
ALT SERPL-CCNC: 28 U/L (ref 10–40)
ANION GAP SERPL CALCULATED.3IONS-SCNC: 14 MMOL/L (ref 3–16)
AST SERPL-CCNC: 40 U/L (ref 15–37)
BASE EXCESS VENOUS: -0.2 MMOL/L (ref -3–3)
BASOPHILS ABSOLUTE: 0 K/UL (ref 0–0.2)
BASOPHILS RELATIVE PERCENT: 0.2 %
BILIRUB SERPL-MCNC: 0.5 MG/DL (ref 0–1)
BUN BLDV-MCNC: 17 MG/DL (ref 7–20)
CALCIUM SERPL-MCNC: 9.1 MG/DL (ref 8.3–10.6)
CARBOXYHEMOGLOBIN: 1.4 % (ref 0–1.5)
CHLORIDE BLD-SCNC: 97 MMOL/L (ref 99–110)
CO2: 22 MMOL/L (ref 21–32)
CREAT SERPL-MCNC: 0.8 MG/DL (ref 0.9–1.3)
EKG ATRIAL RATE: 90 BPM
EKG DIAGNOSIS: NORMAL
EKG P AXIS: 64 DEGREES
EKG P-R INTERVAL: 134 MS
EKG Q-T INTERVAL: 354 MS
EKG QRS DURATION: 80 MS
EKG QTC CALCULATION (BAZETT): 433 MS
EKG R AXIS: 8 DEGREES
EKG T AXIS: 70 DEGREES
EKG VENTRICULAR RATE: 90 BPM
EOSINOPHILS ABSOLUTE: 0 K/UL (ref 0–0.6)
EOSINOPHILS RELATIVE PERCENT: 0 %
GFR AFRICAN AMERICAN: >60
GFR NON-AFRICAN AMERICAN: >60
GLUCOSE BLD-MCNC: 212 MG/DL (ref 70–99)
GLUCOSE BLD-MCNC: 231 MG/DL (ref 70–99)
GLUCOSE BLD-MCNC: 254 MG/DL (ref 70–99)
GLUCOSE BLD-MCNC: 259 MG/DL (ref 70–99)
GLUCOSE BLD-MCNC: 271 MG/DL (ref 70–99)
HCO3 VENOUS: 23.8 MMOL/L (ref 23–29)
HCT VFR BLD CALC: 45 % (ref 40.5–52.5)
HEMOGLOBIN: 15 G/DL (ref 13.5–17.5)
INFLUENZA A: NOT DETECTED
INFLUENZA B: NOT DETECTED
LACTIC ACID: 1.5 MMOL/L (ref 0.4–2)
LYMPHOCYTES ABSOLUTE: 0.9 K/UL (ref 1–5.1)
LYMPHOCYTES RELATIVE PERCENT: 11.7 %
MCH RBC QN AUTO: 30.1 PG (ref 26–34)
MCHC RBC AUTO-ENTMCNC: 33.4 G/DL (ref 31–36)
MCV RBC AUTO: 90.2 FL (ref 80–100)
METHEMOGLOBIN VENOUS: 0.3 %
MONOCYTES ABSOLUTE: 1 K/UL (ref 0–1.3)
MONOCYTES RELATIVE PERCENT: 12.3 %
NEUTROPHILS ABSOLUTE: 6.1 K/UL (ref 1.7–7.7)
NEUTROPHILS RELATIVE PERCENT: 75.8 %
O2 SAT, VEN: 86 %
O2 THERAPY: ABNORMAL
PCO2, VEN: 37 MMHG (ref 40–50)
PDW BLD-RTO: 13.4 % (ref 12.4–15.4)
PERFORMED ON: ABNORMAL
PH VENOUS: 7.43 (ref 7.35–7.45)
PLATELET # BLD: 142 K/UL (ref 135–450)
PMV BLD AUTO: 10 FL (ref 5–10.5)
PO2, VEN: 49.4 MMHG (ref 25–40)
POTASSIUM REFLEX MAGNESIUM: 4.3 MMOL/L (ref 3.5–5.1)
RBC # BLD: 4.99 M/UL (ref 4.2–5.9)
SARS-COV-2 RNA, RT PCR: DETECTED
SODIUM BLD-SCNC: 133 MMOL/L (ref 136–145)
TCO2 CALC VENOUS: 25 MMOL/L
TOTAL PROTEIN: 7.5 G/DL (ref 6.4–8.2)
TROPONIN: <0.01 NG/ML
WBC # BLD: 8 K/UL (ref 4–11)

## 2021-12-14 PROCEDURE — 93010 ELECTROCARDIOGRAM REPORT: CPT | Performed by: INTERNAL MEDICINE

## 2021-12-14 PROCEDURE — 82803 BLOOD GASES ANY COMBINATION: CPT

## 2021-12-14 PROCEDURE — 6370000000 HC RX 637 (ALT 250 FOR IP): Performed by: PHYSICIAN ASSISTANT

## 2021-12-14 PROCEDURE — 71045 X-RAY EXAM CHEST 1 VIEW: CPT

## 2021-12-14 PROCEDURE — 6360000002 HC RX W HCPCS: Performed by: NURSE PRACTITIONER

## 2021-12-14 PROCEDURE — 93005 ELECTROCARDIOGRAM TRACING: CPT | Performed by: NURSE PRACTITIONER

## 2021-12-14 PROCEDURE — 1200000000 HC SEMI PRIVATE

## 2021-12-14 PROCEDURE — 83036 HEMOGLOBIN GLYCOSYLATED A1C: CPT

## 2021-12-14 PROCEDURE — 84484 ASSAY OF TROPONIN QUANT: CPT

## 2021-12-14 PROCEDURE — 6360000002 HC RX W HCPCS: Performed by: PHYSICIAN ASSISTANT

## 2021-12-14 PROCEDURE — 86140 C-REACTIVE PROTEIN: CPT

## 2021-12-14 PROCEDURE — 96374 THER/PROPH/DIAG INJ IV PUSH: CPT

## 2021-12-14 PROCEDURE — 2580000003 HC RX 258: Performed by: PHYSICIAN ASSISTANT

## 2021-12-14 PROCEDURE — 80053 COMPREHEN METABOLIC PANEL: CPT

## 2021-12-14 PROCEDURE — 87636 SARSCOV2 & INF A&B AMP PRB: CPT

## 2021-12-14 PROCEDURE — 83605 ASSAY OF LACTIC ACID: CPT

## 2021-12-14 PROCEDURE — 99285 EMERGENCY DEPT VISIT HI MDM: CPT

## 2021-12-14 PROCEDURE — 85025 COMPLETE CBC W/AUTO DIFF WBC: CPT

## 2021-12-14 RX ORDER — ALBUTEROL SULFATE 90 UG/1
2 AEROSOL, METERED RESPIRATORY (INHALATION) EVERY 6 HOURS PRN
Status: DISCONTINUED | OUTPATIENT
Start: 2021-12-14 | End: 2021-12-14

## 2021-12-14 RX ORDER — CETIRIZINE HYDROCHLORIDE 10 MG/1
10 TABLET ORAL DAILY
Status: DISCONTINUED | OUTPATIENT
Start: 2021-12-14 | End: 2021-12-17 | Stop reason: HOSPADM

## 2021-12-14 RX ORDER — ACETAMINOPHEN 650 MG/1
650 SUPPOSITORY RECTAL EVERY 6 HOURS PRN
Status: DISCONTINUED | OUTPATIENT
Start: 2021-12-14 | End: 2021-12-17 | Stop reason: HOSPADM

## 2021-12-14 RX ORDER — DEXAMETHASONE 4 MG/1
6 TABLET ORAL DAILY
Status: DISCONTINUED | OUTPATIENT
Start: 2021-12-15 | End: 2021-12-17 | Stop reason: HOSPADM

## 2021-12-14 RX ORDER — GUAIFENESIN/DEXTROMETHORPHAN 100-10MG/5
5 SYRUP ORAL EVERY 4 HOURS PRN
Status: DISCONTINUED | OUTPATIENT
Start: 2021-12-14 | End: 2021-12-17 | Stop reason: HOSPADM

## 2021-12-14 RX ORDER — ACETAMINOPHEN 325 MG/1
650 TABLET ORAL EVERY 6 HOURS PRN
Status: DISCONTINUED | OUTPATIENT
Start: 2021-12-14 | End: 2021-12-17 | Stop reason: HOSPADM

## 2021-12-14 RX ORDER — ONDANSETRON 2 MG/ML
4 INJECTION INTRAMUSCULAR; INTRAVENOUS EVERY 6 HOURS PRN
Status: DISCONTINUED | OUTPATIENT
Start: 2021-12-14 | End: 2021-12-17 | Stop reason: HOSPADM

## 2021-12-14 RX ORDER — SODIUM CHLORIDE 0.9 % (FLUSH) 0.9 %
5-40 SYRINGE (ML) INJECTION PRN
Status: DISCONTINUED | OUTPATIENT
Start: 2021-12-14 | End: 2021-12-17 | Stop reason: HOSPADM

## 2021-12-14 RX ORDER — ASCORBIC ACID 500 MG
250 TABLET ORAL DAILY
Status: DISCONTINUED | OUTPATIENT
Start: 2021-12-14 | End: 2021-12-17 | Stop reason: HOSPADM

## 2021-12-14 RX ORDER — ZINC SULFATE 50(220)MG
50 CAPSULE ORAL DAILY
Status: DISCONTINUED | OUTPATIENT
Start: 2021-12-15 | End: 2021-12-17 | Stop reason: HOSPADM

## 2021-12-14 RX ORDER — SODIUM CHLORIDE 9 MG/ML
25 INJECTION, SOLUTION INTRAVENOUS PRN
Status: DISCONTINUED | OUTPATIENT
Start: 2021-12-14 | End: 2021-12-17 | Stop reason: HOSPADM

## 2021-12-14 RX ORDER — DEXAMETHASONE SODIUM PHOSPHATE 10 MG/ML
10 INJECTION, SOLUTION INTRAMUSCULAR; INTRAVENOUS ONCE
Status: COMPLETED | OUTPATIENT
Start: 2021-12-14 | End: 2021-12-14

## 2021-12-14 RX ORDER — AMOXICILLIN 500 MG/1
500 CAPSULE ORAL 3 TIMES DAILY
Status: DISCONTINUED | OUTPATIENT
Start: 2021-12-14 | End: 2021-12-17 | Stop reason: HOSPADM

## 2021-12-14 RX ORDER — SODIUM CHLORIDE 0.9 % (FLUSH) 0.9 %
5-40 SYRINGE (ML) INJECTION EVERY 12 HOURS SCHEDULED
Status: DISCONTINUED | OUTPATIENT
Start: 2021-12-14 | End: 2021-12-17 | Stop reason: HOSPADM

## 2021-12-14 RX ORDER — DEXTROSE MONOHYDRATE 50 MG/ML
100 INJECTION, SOLUTION INTRAVENOUS PRN
Status: DISCONTINUED | OUTPATIENT
Start: 2021-12-14 | End: 2021-12-17 | Stop reason: HOSPADM

## 2021-12-14 RX ORDER — ONDANSETRON 4 MG/1
4 TABLET, ORALLY DISINTEGRATING ORAL EVERY 8 HOURS PRN
Status: DISCONTINUED | OUTPATIENT
Start: 2021-12-14 | End: 2021-12-17 | Stop reason: HOSPADM

## 2021-12-14 RX ORDER — DEXTROSE MONOHYDRATE 25 G/50ML
12.5 INJECTION, SOLUTION INTRAVENOUS PRN
Status: DISCONTINUED | OUTPATIENT
Start: 2021-12-14 | End: 2021-12-17 | Stop reason: HOSPADM

## 2021-12-14 RX ORDER — NICOTINE POLACRILEX 4 MG
15 LOZENGE BUCCAL PRN
Status: DISCONTINUED | OUTPATIENT
Start: 2021-12-14 | End: 2021-12-17 | Stop reason: HOSPADM

## 2021-12-14 RX ORDER — ATORVASTATIN CALCIUM 10 MG/1
10 TABLET, FILM COATED ORAL DAILY
Status: DISCONTINUED | OUTPATIENT
Start: 2021-12-14 | End: 2021-12-17 | Stop reason: HOSPADM

## 2021-12-14 RX ORDER — VITAMIN B COMPLEX
2000 TABLET ORAL DAILY
Status: DISCONTINUED | OUTPATIENT
Start: 2021-12-14 | End: 2021-12-17 | Stop reason: HOSPADM

## 2021-12-14 RX ORDER — POLYETHYLENE GLYCOL 3350 17 G/17G
17 POWDER, FOR SOLUTION ORAL DAILY PRN
Status: DISCONTINUED | OUTPATIENT
Start: 2021-12-14 | End: 2021-12-17 | Stop reason: HOSPADM

## 2021-12-14 RX ORDER — VITAMIN B COMPLEX
500 TABLET ORAL DAILY
Status: DISCONTINUED | OUTPATIENT
Start: 2021-12-15 | End: 2021-12-17 | Stop reason: HOSPADM

## 2021-12-14 RX ADMIN — OXYCODONE HYDROCHLORIDE AND ACETAMINOPHEN 250 MG: 500 TABLET ORAL at 23:45

## 2021-12-14 RX ADMIN — Medication 2000 UNITS: at 23:44

## 2021-12-14 RX ADMIN — ATORVASTATIN CALCIUM 10 MG: 10 TABLET, FILM COATED ORAL at 23:45

## 2021-12-14 RX ADMIN — CETIRIZINE HYDROCHLORIDE 10 MG: 10 TABLET ORAL at 23:45

## 2021-12-14 RX ADMIN — DEXAMETHASONE SODIUM PHOSPHATE 10 MG: 10 INJECTION INTRAMUSCULAR; INTRAVENOUS at 14:44

## 2021-12-14 RX ADMIN — AMOXICILLIN 500 MG: 500 CAPSULE ORAL at 23:46

## 2021-12-14 RX ADMIN — ENOXAPARIN SODIUM 30 MG: 100 INJECTION SUBCUTANEOUS at 23:44

## 2021-12-14 RX ADMIN — Medication 10 ML: at 23:46

## 2021-12-14 ASSESSMENT — ENCOUNTER SYMPTOMS
SORE THROAT: 0
COUGH: 1
VOMITING: 0
ABDOMINAL PAIN: 0
BACK PAIN: 0
DIARRHEA: 0
EYE PAIN: 0
RHINORRHEA: 0
SHORTNESS OF BREATH: 1
BLOOD IN STOOL: 0
NAUSEA: 0

## 2021-12-14 ASSESSMENT — PAIN SCALES - GENERAL: PAINLEVEL_OUTOF10: 0

## 2021-12-14 NOTE — TELEPHONE ENCOUNTER
Spoke to patients wife regarding the patients condition that the patients O2 level was at 80 when they call in and was Short of breath patients wife stated that she will take it again while on the phone and the O2 lever went up to 90 but I did inform her that Per Demie she should take him to the ER because of the Decreased O2 levels and the Shortness of breath

## 2021-12-14 NOTE — TELEPHONE ENCOUNTER
Pt wife called and wanted to get him a VV. She states that he is having SOB and and O2 is decreasing. I advised that he be evaled in ED.      Will send to MD on-call to review  Spore,sean

## 2021-12-14 NOTE — ED PROVIDER NOTES
[address] ED  EMERGENCY DEPARTMENT ENCOUNTER        Pt Name: Paddy Quijano  MRN: [id number]  [place] 1965  Date of evaluation: 12/14/2021  Provider: RILEY Sharma CNP  PCP: No primary care provider on file. Note Started: 4:24 PM EST       I have seen and evaluated this patient with my supervising physician Roel Gonzalez DO. Triage CHIEF COMPLAINT       Chief Complaint   Patient presents with    Positive For Covid-19     Patient states that he tested for COVID last week, increased shortness of breath. HISTORY OF PRESENT ILLNESS   (Location/Symptom, Timing/Onset, Context/Setting, Quality, Duration, Modifying Factors, Severity)  Note limiting factors. Chief Complaint: Shortness of breath     Paddy Quijano is a 64 y.o. male who presents to the emergency department with symptoms of shortness of breath. The patient began with shortness of breath approximately 4 days ago. He was most recently tested positive for COVID-19. He states that he has been symptomatic now for about 13 days. His symptoms began with some mild congestion and body aches. Denies any symptoms of back pain or abdominal pain. No vomiting. No hematemesis or hemoptysis. Denies chest pain. Nursing Notes were all reviewed and agreed with or any disagreements were addressed in the HPI. REVIEW OF SYSTEMS    (2-9 systems for level 4, 10 or more for level 5)     Review of Systems   Constitutional: Negative for chills, diaphoresis and fever. HENT: Negative for congestion, ear pain, rhinorrhea and sore throat. Eyes: Negative for pain and visual disturbance. Respiratory: Positive for cough and shortness of breath. Cardiovascular: Negative for chest pain and leg swelling. Gastrointestinal: Negative for abdominal pain, blood in stool, diarrhea, nausea and vomiting. Genitourinary: Negative for difficulty urinating, dysuria, flank pain and frequency.    Musculoskeletal: Negative for back pain and neck pain. Skin: Negative for rash and wound. Neurological: Negative for dizziness and light-headedness. PAST MEDICAL HISTORY     Past Medical History:   Diagnosis Date    Diabetes ([address]     Hyperlipidemia        SURGICAL HISTORY     Past Surgical History:   Procedure Laterality Date    COLONOSCOPY  2018    Recheck 3 years    FINGER SURGERY      left index finger tip off - repaired    KNEE ARTHROSCOPY      right     NOSE SURGERY      tip of nose cut off - repaired       CURRENTMEDICATIONS       Previous Medications    AMOXICILLIN (AMOXIL) 500 MG CAPSULE    Take 1 capsule by mouth 3 times daily for 10 days    ASCORBIC ACID (VITAMIN C) 250 MG TABLET    Take 250 mg by mouth daily    ATORVASTATIN (LIPITOR) 10 MG TABLET    Take 1 tablet by mouth daily    EMPAGLIFLOZIN (JARDIANCE) 25 MG TABLET    Take 1 tablet by mouth daily    ERTUGLIFLOZIN L-PYROGLUTAMICAC (STEGLATRO) 15 MG TABS    Take 1 tablet by mouth daily    LEVOCETIRIZINE (XYZAL) 5 MG TABLET    Take 5 mg by mouth nightly    OMEGA-3 FATTY ACIDS (FISH OIL) 1000 MG CAPS    Take 1,000 mg by mouth daily     SITAGLIPTIN-METFORMIN HCL ER (JANUMET XR) 100-1000 MG TB24    TAKE 1 TABLET BY MOUTH ONE TIME A DAY    VITAMIN D3 (CHOLECALCIFEROL) 10 MCG (400 UNIT) TABS TABLET    Take 400 Units by mouth daily    ZINC SULFATE (ZINC 15 PO)    Take by mouth       ALLERGIES     Patient has no known allergies.     FAMILYHISTORY       Family History   Problem Relation Age of Onset    Cancer Father         lymphoma,     Diabetes Father         gout    High Blood Pressure Mother         SOCIAL HISTORY       Social History     Socioeconomic History    Marital status:      Spouse name: Not on file    Number of children: Not on file    Years of education: Not on file    Highest education level: Not on file   Occupational History    Not on file   Tobacco Use    Smoking status: Never Smoker    Smokeless tobacco: Never Used   Substance and Sexual Activity    Alcohol use: No    Drug use: Not on file    Sexual activity: Never   Other Topics Concern    Not on file   Social History Narrative    Not on file     Social Determinants of Health     Financial Resource Strain: Low Risk     Difficulty of Paying Living Expenses: Not hard at all   Food Insecurity: No Food Insecurity    Worried About Running Out of Food in the Last Year: Never true    [address] in the Last Year: Never true   Transportation Needs:     Lack of Transportation (Medical): Not on file    Lack of Transportation (Non-Medical): Not on file   Physical Activity:     Days of Exercise per Week: Not on file    Minutes of Exercise per Session: Not on file   Stress:     Feeling of Stress : Not on file   Social Connections:     Frequency of Communication with Friends and Family: Not on file    Frequency of Social Gatherings with Friends and Family: Not on file    Attends Religion Services: Not on file    Active Member of [address] [place] [address] or Organizations: Not on file    Attends Club or Organization Meetings: Not on file    Marital Status: Not on file   Intimate Partner Violence:     Fear of Current or Ex-Partner: Not on file    Emotionally Abused: Not on file    Physically Abused: Not on file    Sexually Abused: Not on file   Housing Stability:     Unable to Pay for Housing in the Last Year: Not on file    Number of [place] in the Last Year: Not on file    Unstable Housing in the Last Year: Not on file       SCREENINGS             PHYSICAL EXAM    (up to 7 for level 4, 8 or more for level 5)     ED Triage Vitals [12/14/21 1332]   BP Temp Temp Source Pulse Resp SpO2 Height Weight   (!) 148/90 99.4 °F (37.4 °C) Temporal 99 15 (!) 89 % 6' 4\" (1.93 m) 288 lb (130.6 kg)       Physical Exam  Vitals and nursing note reviewed. Constitutional:       Appearance: Normal appearance. He is not toxic-appearing or diaphoretic. HENT:      Head: Normocephalic and atraumatic.       Nose: Nose normal.   Eyes:      General:         Right eye: No discharge. Left eye: No discharge. Cardiovascular:      Rate and Rhythm: Normal rate and regular rhythm. Pulses: Normal pulses. Heart sounds: No murmur heard. No friction rub. Pulmonary:      Effort: Pulmonary effort is normal. No respiratory distress. Breath sounds: No wheezing or rhonchi. Abdominal:      Palpations: Abdomen is soft. Tenderness: There is no abdominal tenderness. Musculoskeletal:         General: Normal range of motion. Cervical back: Normal range of motion and neck supple. Skin:     General: Skin is warm and dry. Capillary Refill: Capillary refill takes less than 2 seconds. Neurological:      General: No focal deficit present. Mental Status: He is alert and oriented to person, place, and time.    Psychiatric:         Mood and Affect: Mood normal.         Behavior: Behavior normal.         DIAGNOSTIC RESULTS   LABS:    Labs Reviewed   COVID-19 & INFLUENZA COMBO - Abnormal; Notable for the following components:       Result Value    SARS-CoV-2 RNA, RT PCR DETECTED (*)     All other components within normal limits    Narrative:     Performed at:  [place] [address],  [place], [facility]   Phone [phone]   CBC WITH AUTO DIFFERENTIAL - Abnormal; Notable for the following components:    Lymphocytes Absolute 0.9 (*)     All other components within normal limits    Narrative:     Performed at:  [place] [address],  [place], [facility]   Phone [phone]   COMPREHENSIVE METABOLIC PANEL W/ REFLEX TO MG FOR LOW K - Abnormal; Notable for the following components:    Sodium 133 (*)     Chloride 97 (*)     Glucose 212 (*)     CREATININE 0.8 (*)     Albumin 3.3 (*)     Albumin/Globulin Ratio 0.8 (*)     AST 40 (*)     All other components within normal limits    Narrative:     Performed at:  [facility]) - [facility],  [place], [facility]   Phone [phone]   BLOOD GAS, VENOUS - Abnormal; Notable for the following components:    pCO2, [place] 37.0 (*)     pO2, [place] 49.4 (*)     All other components within normal limits    Narrative:     Performed at:  [facility] [address],  [place], [facility]   Phone [phone]   TROPONIN    Narrative:     Performed at:  [facility] [address],  [place], [facility]   Phone [phone]   LACTIC ACID, PLASMA    Narrative:     Performed at:  [place] [address],  [place], [facility]   Phone [phone]   C-REACTIVE PROTEIN       When ordered, only abnormal lab results are displayed. All other labs were within normal range or not returned as of this dictation. EKG: When ordered, EKG's are interpreted by the Emergency Department Physician in the absence of a cardiologist.  Please see their note for interpretation of EKG. RADIOLOGY:   Non-plain film images such as CT, Ultrasound and MRI are read by the radiologist. Plain radiographic images are visualized andpreliminarily interpreted by the  ED Provider with the below findings:        Interpretation perthe Radiologist below, if available at the time of this note:    XR CHEST PORTABLE   Final Result   Multifocal airspace disease, given history of COVID, this is consistent with   COVID pneumonia. XR CHEST PORTABLE    Result Date: 12/14/2021  EXAMINATION: ONE XRAY VIEW OF THE CHEST 12/14/2021 2:52 pm COMPARISON: None. HISTORY: ORDERING SYSTEM PROVIDED HISTORY: shortness of breath. COVID-19 positive TECHNOLOGIST PROVIDED HISTORY: Reason for exam:->shortness of breath. COVID-19 positive Reason for Exam: Shortness of breath, Covid + FINDINGS: HEART/MEDIASTINUM: The cardiomediastinal silhouette is within normal limits.  PLEURA/LUNGS: Patchy hazy opacities are noted in the lungs bilaterally consistent with multifocal airspace disease. There are no pleural effusions. There is no appreciable pneumothorax. BONES/SOFT TISSUE: No acute abnormality. Multifocal airspace disease, given history of COVID, this is consistent with COVID pneumonia. PROCEDURES   Unless otherwise noted below, none     Procedures    CRITICAL CARE TIME   N/A    CONSULTS:  IP CONSULT TO HOSPITALIST      EMERGENCY DEPARTMENT COURSE and DIFFERENTIAL DIAGNOSIS/MDM:   Vitals:    Vitals:    12/14/21 1332 12/14/21 1430   BP: (!) 148/90 129/77   Pulse: 99 94   Resp: 15 22   Temp: 99.4 °F (37.4 °C)    TempSrc: Temporal    SpO2: (!) 89% 93%   Weight: 288 lb (130.6 kg)    Height: 6' 4\" (1.93 m)        Patient was given thefollowing medications:  Medications   albuterol sulfate  (90 Base) MCG/ACT inhaler 2 puff (has no administration in time range)   dexamethasone (PF) (DECADRON) injection 10 mg (10 mg IntraVENous Given 12/14/21 1444)           Parental diagnoses included pneumonia, COVID-19. The patient will be admitted to the hospitalist service. Patient does have pneumonia and COVID-19. His ox saturation at rest is 89% on room air. Patient has no history of chronic lung disease. The patient splays no other acute complaints and denies any other current symptoms. Patient's heart rate is 94 bpm.  Patient is not complaining of any chest pain or rib pain. FINAL IMPRESSION      1. Acute respiratory failure with hypoxia (HCC)    2. COVID-19          DISPOSITION/PLAN   DISPOSITION Decision To Admit 12/14/2021 04:11:31 PM      PATIENT REFERREDTO:  No follow-up provider specified.     DISCHARGE MEDICATIONS:  New Prescriptions    No medications on file       DISCONTINUED MEDICATIONS:  Discontinued Medications    No medications on file              (Please note that portions ofthis note were completed with a voice recognition program.  Efforts were made to edit the dictations but occasionally words are mis-transcribed.)    RILEY Francis - CNP (electronically signed)            RILEY Francis CNP  12/14/21 9191

## 2021-12-14 NOTE — ED PROVIDER NOTES
I independently examined and evaluated Ozzy Marks. In brief, this 51-year-old male is presenting with cough and shortness of breath. He is Covid positive, symptoms started 2 weeks ago. He feels that his breathing has been worsening over the last week. He does have dyspnea on exertion. Does denies leg pain, swelling, rash. .    Focused exam revealed   General: Alert, no acute distress, patient resting comfortably   Skin: warm, intact, no pallor noted   Head: Normocephalic, atraumatic   Eye: Normal conjunctiva   Cardiac: Normal peripheral perfusion  Respiratory: No acute distress on 3 L nasal cannula  Musculoskeletal: No deformity, full ROM. Neurological: alert and oriented, normal sensory and motor observed. Psychiatric: Cooperative    ED course: Presented with Covid and found to be hypoxic with oxygen saturations in the 80s. Started on Decadron, discussed with hospitalist and admitted. ECG    The Ekg interpreted by me shows sinus rhythm with a rate of 90 bpm.  Normal axis. No acute injury pattern. , QRS 80, QTc 433. All diagnostic, treatment, and disposition decisions were made by myself in conjunction with the advanced practice provider. For all further details of the patient's emergency department visit, please see the advanced practice provider's documentation. Comment: Please note this report has been produced using speech recognition software and may contain errors related to that system including errors in grammar, punctuation, and spelling, as well as words and phrases that may be inappropriate. If there are any questions or concerns please feel free to contact the dictating provider for clarification.        Manolo Jade, DO  12/14/21 2600

## 2021-12-15 PROBLEM — E78.5 HYPERLIPIDEMIA: Status: ACTIVE | Noted: 2021-12-15

## 2021-12-15 PROBLEM — J96.00 ACUTE RESPIRATORY FAILURE DUE TO COVID-19 (HCC): Status: ACTIVE | Noted: 2021-12-15

## 2021-12-15 PROBLEM — U07.1 ACUTE RESPIRATORY FAILURE DUE TO COVID-19 (HCC): Status: ACTIVE | Noted: 2021-12-15

## 2021-12-15 LAB
A/G RATIO: 0.8 (ref 1.1–2.2)
ALBUMIN SERPL-MCNC: 3.4 G/DL (ref 3.4–5)
ALP BLD-CCNC: 61 U/L (ref 40–129)
ALT SERPL-CCNC: 25 U/L (ref 10–40)
ANION GAP SERPL CALCULATED.3IONS-SCNC: 17 MMOL/L (ref 3–16)
AST SERPL-CCNC: 32 U/L (ref 15–37)
BASOPHILS ABSOLUTE: 0 K/UL (ref 0–0.2)
BASOPHILS RELATIVE PERCENT: 0.3 %
BILIRUB SERPL-MCNC: 0.4 MG/DL (ref 0–1)
BUN BLDV-MCNC: 20 MG/DL (ref 7–20)
C-REACTIVE PROTEIN: 174.1 MG/L (ref 0–5.1)
C-REACTIVE PROTEIN: 181.6 MG/L (ref 0–5.1)
C-REACTIVE PROTEIN: 189.3 MG/L (ref 0–5.1)
CALCIUM SERPL-MCNC: 9.2 MG/DL (ref 8.3–10.6)
CHLORIDE BLD-SCNC: 101 MMOL/L (ref 99–110)
CO2: 21 MMOL/L (ref 21–32)
CREAT SERPL-MCNC: 0.7 MG/DL (ref 0.9–1.3)
EOSINOPHILS ABSOLUTE: 0 K/UL (ref 0–0.6)
EOSINOPHILS RELATIVE PERCENT: 0 %
ESTIMATED AVERAGE GLUCOSE: 171.4 MG/DL
GFR AFRICAN AMERICAN: >60
GFR NON-AFRICAN AMERICAN: >60
GLUCOSE BLD-MCNC: 212 MG/DL (ref 70–99)
GLUCOSE BLD-MCNC: 225 MG/DL (ref 70–99)
GLUCOSE BLD-MCNC: 236 MG/DL (ref 70–99)
GLUCOSE BLD-MCNC: 265 MG/DL (ref 70–99)
GLUCOSE BLD-MCNC: 270 MG/DL (ref 70–99)
HBA1C MFR BLD: 7.6 %
HCT VFR BLD CALC: 47 % (ref 40.5–52.5)
HEMOGLOBIN: 15.3 G/DL (ref 13.5–17.5)
LYMPHOCYTES ABSOLUTE: 0.6 K/UL (ref 1–5.1)
LYMPHOCYTES RELATIVE PERCENT: 12.6 %
MCH RBC QN AUTO: 29.7 PG (ref 26–34)
MCHC RBC AUTO-ENTMCNC: 32.5 G/DL (ref 31–36)
MCV RBC AUTO: 91.2 FL (ref 80–100)
MONOCYTES ABSOLUTE: 0.5 K/UL (ref 0–1.3)
MONOCYTES RELATIVE PERCENT: 10.2 %
NEUTROPHILS ABSOLUTE: 3.7 K/UL (ref 1.7–7.7)
NEUTROPHILS RELATIVE PERCENT: 76.9 %
PDW BLD-RTO: 13.7 % (ref 12.4–15.4)
PERFORMED ON: ABNORMAL
PLATELET # BLD: 143 K/UL (ref 135–450)
PMV BLD AUTO: 10.7 FL (ref 5–10.5)
POTASSIUM REFLEX MAGNESIUM: 4.9 MMOL/L (ref 3.5–5.1)
PROCALCITONIN: 0.5 NG/ML (ref 0–0.15)
RBC # BLD: 5.15 M/UL (ref 4.2–5.9)
SODIUM BLD-SCNC: 139 MMOL/L (ref 136–145)
TOTAL PROTEIN: 7.7 G/DL (ref 6.4–8.2)
WBC # BLD: 4.8 K/UL (ref 4–11)

## 2021-12-15 PROCEDURE — 2580000003 HC RX 258: Performed by: INTERNAL MEDICINE

## 2021-12-15 PROCEDURE — 1200000000 HC SEMI PRIVATE

## 2021-12-15 PROCEDURE — 6360000002 HC RX W HCPCS: Performed by: PHYSICIAN ASSISTANT

## 2021-12-15 PROCEDURE — 80053 COMPREHEN METABOLIC PANEL: CPT

## 2021-12-15 PROCEDURE — 85025 COMPLETE CBC W/AUTO DIFF WBC: CPT

## 2021-12-15 PROCEDURE — 99233 SBSQ HOSP IP/OBS HIGH 50: CPT | Performed by: INTERNAL MEDICINE

## 2021-12-15 PROCEDURE — XW033H5 INTRODUCTION OF TOCILIZUMAB INTO PERIPHERAL VEIN, PERCUTANEOUS APPROACH, NEW TECHNOLOGY GROUP 5: ICD-10-PCS | Performed by: INTERNAL MEDICINE

## 2021-12-15 PROCEDURE — 2580000003 HC RX 258: Performed by: PHYSICIAN ASSISTANT

## 2021-12-15 PROCEDURE — 6370000000 HC RX 637 (ALT 250 FOR IP): Performed by: PHYSICIAN ASSISTANT

## 2021-12-15 PROCEDURE — 36415 COLL VENOUS BLD VENIPUNCTURE: CPT

## 2021-12-15 PROCEDURE — 6370000000 HC RX 637 (ALT 250 FOR IP): Performed by: INTERNAL MEDICINE

## 2021-12-15 PROCEDURE — 6360000002 HC RX W HCPCS: Performed by: INTERNAL MEDICINE

## 2021-12-15 PROCEDURE — 86140 C-REACTIVE PROTEIN: CPT

## 2021-12-15 PROCEDURE — 84145 PROCALCITONIN (PCT): CPT

## 2021-12-15 RX ADMIN — METFORMIN HYDROCHLORIDE 1000 MG: 500 TABLET ORAL at 09:55

## 2021-12-15 RX ADMIN — Medication 500 UNITS: at 09:57

## 2021-12-15 RX ADMIN — ATORVASTATIN CALCIUM 10 MG: 10 TABLET, FILM COATED ORAL at 09:55

## 2021-12-15 RX ADMIN — ZINC SULFATE 220 MG (50 MG) CAPSULE 50 MG: CAPSULE at 09:55

## 2021-12-15 RX ADMIN — METFORMIN HYDROCHLORIDE 1000 MG: 500 TABLET ORAL at 17:12

## 2021-12-15 RX ADMIN — Medication 10 ML: at 09:59

## 2021-12-15 RX ADMIN — SODIUM CHLORIDE 25 ML: 9 INJECTION, SOLUTION INTRAVENOUS at 15:21

## 2021-12-15 RX ADMIN — AMOXICILLIN 500 MG: 500 CAPSULE ORAL at 20:35

## 2021-12-15 RX ADMIN — Medication 2000 UNITS: at 09:54

## 2021-12-15 RX ADMIN — TOCILIZUMAB 810 MG: 180 INJECTION, SOLUTION SUBCUTANEOUS at 15:22

## 2021-12-15 RX ADMIN — AMOXICILLIN 500 MG: 500 CAPSULE ORAL at 15:19

## 2021-12-15 RX ADMIN — OXYCODONE HYDROCHLORIDE AND ACETAMINOPHEN 250 MG: 500 TABLET ORAL at 09:55

## 2021-12-15 RX ADMIN — CETIRIZINE HYDROCHLORIDE 10 MG: 10 TABLET ORAL at 09:55

## 2021-12-15 RX ADMIN — DEXAMETHASONE 6 MG: 4 TABLET ORAL at 09:54

## 2021-12-15 RX ADMIN — Medication 10 ML: at 20:35

## 2021-12-15 RX ADMIN — ENOXAPARIN SODIUM 30 MG: 100 INJECTION SUBCUTANEOUS at 09:55

## 2021-12-15 RX ADMIN — AMOXICILLIN 500 MG: 500 CAPSULE ORAL at 09:54

## 2021-12-15 RX ADMIN — ENOXAPARIN SODIUM 30 MG: 100 INJECTION SUBCUTANEOUS at 20:35

## 2021-12-15 ASSESSMENT — PAIN SCALES - GENERAL: PAINLEVEL_OUTOF10: 0

## 2021-12-15 NOTE — FLOWSHEET NOTE
12/15/21 0738   Handoff   Communication Given Shift Handoff   Oncoming Nurse/Offgoing Nurse Mau/Selam   Handoff Communication Face to Face   Time Handoff Given 0730

## 2021-12-15 NOTE — PROGRESS NOTES
Progress Note    Admit Date:  12/14/2021    Subjective:  Mr. Bre Avila is feeling tired. He is short of breath. Has a cough. No fever. He is unvaccinated. Objective:   BP (!) 148/80   Pulse 81   Temp 97.7 °F (36.5 °C) (Oral)   Resp 18   Ht 6' 4\" (1.93 m)   Wt 288 lb (130.6 kg)   SpO2 94%   BMI 35.06 kg/m²        Intake/Output Summary (Last 24 hours) at 12/15/2021 0951  Last data filed at 12/15/2021 0930  Gross per 24 hour   Intake 250 ml   Output 300 ml   Net -50 ml       Physical Exam:    General appearance: alert, appears stated age and cooperative/ill-appearing  Head: Normocephalic, without obvious abnormality, atraumatic  Eyes: conjunctivae/corneas clear. PERRL, EOM's intact. Neck: no adenopathy, no carotid bruit, no JVD, supple, symmetrical, trachea midline and thyroid not enlarged, symmetric, no tenderness/mass/nodules  Lungs: Minimal accessory muscle use. Bibasilar crackles. No wheezes or rhonchi.   Heart: regular rate and rhythm, S1, S2 normal, no murmur, click, rub or gallop  Abdomen: soft, non-tender; bowel sounds normal; no masses,  no organomegaly  Extremities: extremities normal, atraumatic, no cyanosis or edema  Pulses: 2+ and symmetric  Skin: Skin color, texture, turgor normal. No rashes or lesions  Neurologic: Grossly normal    Scheduled Meds:   metFORMIN  1,000 mg Oral BID WC    amoxicillin  500 mg Oral TID    vitamin C  250 mg Oral Daily    atorvastatin  10 mg Oral Daily    empagliflozin  1 tablet Oral Daily    cetirizine  10 mg Oral Daily    Vitamin D  500 Units Oral Daily    zinc sulfate  50 mg Oral Daily    sodium chloride flush  5-40 mL IntraVENous 2 times per day    enoxaparin  30 mg SubCUTAneous BID    dexamethasone  6 mg Oral Daily    Vitamin D  2,000 Units Oral Daily    insulin lispro  0-12 Units SubCUTAneous TID     insulin lispro  0-6 Units SubCUTAneous Nightly       Continuous Infusions:   sodium chloride      dextrose         PRN Meds:  sodium chloride flush, sodium chloride, ondansetron **OR** ondansetron, polyethylene glycol, acetaminophen **OR** acetaminophen, glucose, dextrose, glucagon (rDNA), dextrose, guaiFENesin-dextromethorphan      Data:  CBC:   Recent Labs     12/14/21  1419 12/15/21  0536   WBC 8.0 4.8   HGB 15.0 15.3   HCT 45.0 47.0   MCV 90.2 91.2    143     BMP:   Recent Labs     12/14/21  1419 12/15/21  0536   * 139   K 4.3 4.9   CL 97* 101   CO2 22 21   BUN 17 20   CREATININE 0.8* 0.7*     LIVER PROFILE:   Recent Labs     12/14/21  1419 12/15/21  0536   AST 40* 32   ALT 28 25   BILITOT 0.5 0.4   ALKPHOS 62 61     PT/INR: No results for input(s): PROTIME, INR in the last 72 hours. Cultures:   Results for Sage Moser (MRN [id number]) as of 12/15/2021 08:51   Ref. Range 12/14/2021 14:54   INFLUENZA A Latest Ref Range: NOT DETECTED  NOT DETECTED   INFLUENZA B Latest Ref Range: NOT DETECTED  NOT DETECTED   SARS-CoV-2 RNA, RT PCR Latest Ref Range: NOT DETECTED  DETECTED (A)     XR CHEST PORTABLE   Final Result   Multifocal airspace disease, given history of COVID, this is consistent with   COVID pneumonia. Assessment:  Principal Problem:    Acute respiratory failure due to COVID-19 [facility])  Active Problems:    Type 2 diabetes mellitus with hyperglycemia, without long-term current use of insulin (HCC)    Hyperlipidemia, mixed    Pneumonia due to COVID-19 virus  Resolved Problems:    * No resolved hospital problems. *      Plan:    #Acute respiratory failure due to COVID-19. He was 89% on 5 L recently up titrated him to 6 L. Continue Decadron. He is outside the window for remdesivir. Pharmacy consulted for Tocilizumab. He meets criteria because of increasing oxygen demands and elevated CRP. We talked about manual proning. I told him to lay on his side. His procalcitonin is mildly elevated. He is presently on amoxicillin. We will continue for now. #COVID-19 pneumonia. As above. He is unvaccinated.     #Diabetes type 2. Use sliding scale insulin. Continue home regimen. Sugars elevated due to Decadron. #Lovenox 30 twice daily for DVT prophylaxis. #On Lipitor for hyperlipidemia      IMPORTANT: Please note that some portions of this note may have been created using Dragon voice recognition software. Some \"sound-alike\" and totally wrong word substitutions may have taken place due to known inherent limitations of any such software, including this voice recognition software. In spite of efforts to eliminate such errors, some may not have been corrected. So please read the note with this in mind and recognize such mistakes and understand the correct version using the  context. If there are still uncertainties in the mind of the medical provider reading this note about any aspect of the note, the provider can feel free to contact me. Thanks.      Naima Gaviria MD, MD 12/15/2021 9:51 AM

## 2021-12-15 NOTE — PLAN OF CARE
Problem: Airway Clearance - Ineffective  Goal: Achieve or maintain patent airway  12/15/2021 1133 by Demetria Simms RN  Outcome: Ongoing  12/15/2021 0327 by Aggie Lord RN  Outcome: Ongoing     Problem: Gas Exchange - Impaired  Goal: Absence of hypoxia  12/15/2021 1133 by Demetria Simms RN  Outcome: Ongoing  12/15/2021 0327 by Aggie Lord RN  Outcome: Ongoing  Goal: Promote optimal lung function  12/15/2021 1133 by Demetria Simms RN  Outcome: Ongoing  12/15/2021 0327 by Aggie Lord RN  Outcome: Ongoing     Problem: Breathing Pattern - Ineffective  Goal: Ability to achieve and maintain a regular respiratory rate  12/15/2021 1133 by Demetria Simms RN  Outcome: Ongoing  12/15/2021 0327 by Aggie Lord RN  Outcome: Ongoing     Problem:  Body Temperature -  Risk of, Imbalanced  Goal: Ability to maintain a body temperature within defined limits  12/15/2021 1133 by Demetria Simms RN  Outcome: Ongoing  12/15/2021 0327 by Aggie Lord RN  Outcome: Ongoing  Goal: Will regain or maintain usual level of consciousness  12/15/2021 1133 by Demetria Simms RN  Outcome: Ongoing  12/15/2021 0327 by Aggie Lord RN  Outcome: Ongoing  Goal: Complications related to the disease process, condition or treatment will be avoided or minimized  12/15/2021 1133 by Demetria Simms RN  Outcome: Ongoing  12/15/2021 0327 by Aggie Lord RN  Outcome: Ongoing     Problem: Isolation Precautions - Risk of Spread of Infection  Goal: Prevent transmission of infection  12/15/2021 1133 by Demetria Simms RN  Outcome: Ongoing  12/15/2021 0327 by Aggie Lord RN  Outcome: Ongoing     Problem: Nutrition Deficits  Goal: Optimize nutritional status  12/15/2021 1133 by Demetria Simms RN  Outcome: Ongoing  12/15/2021 0327 by Aggie Lord RN  Outcome: Ongoing     Problem: Risk for Fluid Volume Deficit  Goal: Maintain normal heart rhythm  12/15/2021 1133 by Demetria Simms RN  Outcome: Ongoing  12/15/2021 0327 by Aggie Lord RN  Outcome: Ongoing  Goal: Maintain absence of muscle cramping  12/15/2021 1133 by Anamika Dodson RN  Outcome: Ongoing  12/15/2021 0327 by Nick Morgan RN  Outcome: Ongoing  Goal: Maintain normal serum potassium, sodium, calcium, phosphorus, and pH  12/15/2021 1133 by Anamika Dodson RN  Outcome: Ongoing  12/15/2021 0327 by Nick Morgan RN  Outcome: Ongoing     Problem: Loneliness or Risk for Loneliness  Goal: Demonstrate positive use of time alone when socialization is not possible  12/15/2021 1133 by Anamika Dodson RN  Outcome: Ongoing  12/15/2021 0327 by Nick Morgan RN  Outcome: Ongoing     Problem: Fatigue  Goal: Verbalize increase energy and improved vitality  12/15/2021 1133 by Anamika Dodson RN  Outcome: Ongoing  12/15/2021 0327 by Nick Morgan RN  Outcome: Ongoing     Problem: Patient Education: Go to Patient Education Activity  Goal: Patient/Family Education  12/15/2021 1133 by Anamika Dodson RN  Outcome: Ongoing  12/15/2021 0327 by Nick Morgan RN  Outcome: Ongoing

## 2021-12-15 NOTE — FLOWSHEET NOTE
12/15/21 0404 12/15/21 0406 12/15/21 0409   Oxygen Therapy   SpO2 (!) 88 % (!) 89 % 93 %   O2 Device Nasal cannula Nasal cannula  --    O2 Flow Rate (L/min) 4 L/min 5 L/min 5 L/min

## 2021-12-15 NOTE — PROGRESS NOTES
Patient admitted to room 230-1 from ER. Patient oriented to room, call light, bed rails, phone, lights and bathroom. Patient instructed about the schedule of the day including: vital sign frequency, lab draws, possible tests, frequency of MD and staff rounds, I &O's, and prescribed diet. Bed alarm deferred patient low fall risk. Telemetry box & continuous pulse ox in place, patient aware of placement and reason. Bed locked, in lowest position, side rails up 2/4, call light within reach. Recliner Assessment:     Patient is able to demonstrate the ability to move from a reclining position to an upright position within the recliner. Bedside Mobility Assessment Tool (BMAT):     Assessment Level 1- Sit and Shake    1. From a semi-reclined position, ask patient to sit up and rotate to a seated position at the side of the bed. Can use the bedrail. 2. Ask patient to reach out and grab your hand and shake making sure patient reaches across his/her midline. Pass- Patient is able to come to a seated position, maintain core strength. Maintains seated balance while reaching across midline. Move on to Assessment Level 2. Assessment Level 2- Stretch and Point   1. With patient in seated position at the side of the bed, have patient place both feet on the floor (or stool) with knees no higher than hips. 2. Ask patient to stretch one leg and straighten the knee, then bend the ankle/flex and point the toes. If appropriate, repeat with the other leg. Pass- Patient is able to demonstrate appropriate quad strength on intended weight bearing limb(s). Move onto Assessment Level 3. Assessment Level 3- Stand   1. Ask patient to elevate off the bed or chair (seated to standing) using an assistive device (cane, bedrail). 2. Patient should be able to raise buttocks off be and hold for a count of five. May repeat once.    Pass- Patient maintains standing stability for at least 5 seconds, proceed to assessment level 4.    Assessment Level 4- Walk   1. Ask patient to march in place at bedside. 2. Then ask patient to advance step and return each foot. Some medical conditions may render a patient from stepping backwards, use your best clinical judgement. Pass- Patient demonstrates balance while shifting weight and ability to step, takes independent steps, does not use assistive device patient is MOBILITY LEVEL 4.       Mobility Level- 4

## 2021-12-15 NOTE — PROGRESS NOTES
4 Eyes Skin Assessment     The patient is being assess for   Admission    I agree that 2 RN's have performed a thorough Head to Toe Skin Assessment on the patient. ALL assessment sites listed below have been assessed. Areas assessed for pressure by both nurses:   [x]   Head, Face, and Ears   [x]   Shoulders, Back, and Chest, Abdomen  [x]   Arms, Elbows, and Hands   [x]   Coccyx, Sacrum, and Ischium  [x]   Legs, Feet, and Heels        No skin issues. Dry bilateral feet. No open wounds. Skin Assessed Under all Medical Devices by both nurses:  O2 device tubing                **SHARE this note so that the co-signing nurse is able to place an eSignature**    Co-signer eSignature: {Esignature:[id number]}    Does the Patient have Skin Breakdown related to pressure?   No     Bobby Prevention initiated:  No   Wound Care Orders initiated:  No      [facility] nurse consulted for Pressure Injury (Stage 3,4, Unstageable, DTI, NWPT, Complex wounds)and New or Established Ostomies:  No      Primary Nurse eSignature: Electronically signed by Foreign David RN on 12/15/21 at 6:48 AM EST

## 2021-12-15 NOTE — ED NOTES
Verbal report to called Vivi Cisneros RN on [address], patient out of ER in wheelchair in stable condition.      Chadwick Holden RN  12/14/21 2019

## 2021-12-15 NOTE — FLOWSHEET NOTE
CMU called, O2 saturation 80%. Entered room, patient standing at bedside after ambulating in room. Encouraged patient to lay in bed. Educated patient to lay in prone position, pt unable to tolerate. Pt lying on left side. O2 saturation slow to recover, removed extension tubing. Provided patient with urinal for bathroom needs. Increased oxygen to 4 L/min via NC. O2 saturation maintaining above >90%.         12/15/21 0048 12/15/21 0054   Vital Signs   Heart Rate Source  --  Monitor   Patient Position  --  Lying left side   Oxygen Therapy   SpO2 (!) 80 %  (ambulating in room.) 91 %   O2 Device Nasal cannula Nasal cannula   O2 Flow Rate (L/min) 3 L/min 4 L/min

## 2021-12-15 NOTE — H&P
Hospital Medicine History & Physical      PCP: No primary care provider on file. Date of Admission: 12/14/2021    Date of Service: Pt seen/examined on 12/14/2021    Chief Complaint: Shortness of breath      History Of Present Illness:    64 y.o. male who presented to the hospital with a chief complaint of worsening shortness of breath. Patient developed symptoms about a 2 weeks ago and tested positive for COVID-19 last week. He was treated with antibiotics without much response. Since then he has been having myalgias, upper respiratory symptoms and congestion. Over the last 4 days however he started to become more short of breath at rest and with exertion. Today his symptoms were so severe that he came to the emergency department to get evaluated. In the ER he was found to be hypoxic on room air. He will be admitted for further medical treatment. Past Medical History:        Diagnosis Date    COVID     Diabetes ([address]     Hyperlipidemia        Past Surgical History:        Procedure Laterality Date    COLONOSCOPY  2018    Recheck 3 years    FINGER SURGERY      left index finger tip off - repaired    KNEE ARTHROSCOPY      right     NOSE SURGERY      tip of nose cut off - repaired       Medications Prior to Admission:     Prior to Admission medications    Medication Sig Start Date End Date Taking?  Authorizing Provider   amoxicillin (AMOXIL) 500 MG capsule Take 1 capsule by mouth 3 times daily for 10 days 12/8/21 12/18/21 Yes Eather Gottron, MD   levocetirizine (XYZAL) 5 MG tablet Take 5 mg by mouth nightly   Yes Historical Provider, MD   Ascorbic Acid (VITAMIN C) 250 MG tablet Take 250 mg by mouth daily   Yes Historical Provider, MD   Omega-3 Fatty Acids (FISH OIL) 1000 MG CAPS Take 1,000 mg by mouth daily    Yes Historical Provider, MD   atorvastatin (LIPITOR) 10 MG tablet Take 1 tablet by mouth daily 6/18/21  Yes Evette Bruno MD   Zinc Sulfate (ZINC 15 PO) Take by mouth   Yes Historical Provider, MD   vitamin D3 (CHOLECALCIFEROL) 10 MCG (400 UNIT) TABS tablet Take 400 Units by mouth daily   Yes Historical Provider, MD   empagliflozin (JARDIANCE) 25 MG tablet Take 1 tablet by mouth daily 8/21/20  Yes Landy Arevalo MD   SITagliptin-metFORMIN HCl ER (JANUMET XR) 100-1000 MG TB24 TAKE 1 TABLET BY MOUTH ONE TIME A DAY 11/5/21   Landy Arevalo MD   Ertugliflozin L-PyroglutamicAc [facility]) 15 MG TABS Take 1 tablet by mouth daily 10/29/21   Landy Arevalo MD       Allergies:  Patient has no known allergies. Social History:      TOBACCO:   reports that he has never smoked. He has never used smokeless tobacco.  ETOH:   reports no history of alcohol use. Family History:          Problem Relation Age of Onset    Cancer Father         lymphoma,     Diabetes Father         gout    High Blood Pressure Mother        REVIEW OF SYSTEMS:   Constitutional:   Positive subjective fevers and myalgias  ENT:  Negative for rhinorrhea, epistaxis, hoarseness, sore throat. Respiratory: Positive for shortness of breath  Cardiovascular:    Denies any chest pain  Gastrointestinal:  Negative for nausea, vomiting, diarrhea  Genitourinary:  Negative for polyuria, dysuria   Hematologic/Lymphatic:  Negative for  bleeding tendency, easy bruising  Musculoskeletal:  Negative for myalgias and arthralgias  Neurologic:  Negative for  confusion,dysarthria. Skin:  Negative for itching,rash  Psychiatric:  Negative for depression,anxiety, agitation. Endocrine:  Negative for polydipsia,polyuria,heat /cold intolerance. PHYSICAL EXAM:  /82   Pulse 77   Temp 97.1 °F (36.2 °C) (Oral)   Resp 16   Ht 6' 4\" (1.93 m)   Wt 288 lb (130.6 kg)   SpO2 93%   BMI 35.06 kg/m²   General appearance:  No apparent distress, appears stated age and cooperative. HEENT:  Normal cephalic, atraumatic without obvious deformity. Pupils equal, round, and reactive to light. Extra ocular muscles intact. Conjunctivae/corneas clear.   Neck: Supple, with full range of motion. No jugular venous distention. Trachea midline. Respiratory:  Normal respiratory effort. Clear to auscultation, bilaterally without Rales/Wheezes/Rhonchi. Cardiovascular:  Regular rate and rhythm with normal S1/S2 without murmurs, rubs or gallops. Abdomen: Soft, non-tender, non-distended with normal bowel sounds. Musculoskeletal:  No clubbing, cyanosis or edema bilaterally. Full range of motion without deformity. Skin: Skin color, texture, turgor normal.  No rashes or lesions. Neurologic:  Neurovascularly intact without any focal sensory/motor deficits. Cranial nerves: II-XII intact, grossly non-focal.  Psychiatric:  Alert and oriented, thought content appropriate, normal insight  Capillary Refill: Brisk,< 3 seconds   Peripheral Pulses: +2 palpable, equal bilaterally       Labs:   Recent Labs     12/14/21  1419   WBC 8.0   HGB 15.0   HCT 45.0        Recent Labs     12/14/21  1419   *   K 4.3   CL 97*   CO2 22   BUN 17   CREATININE 0.8*   CALCIUM 9.1     Recent Labs     12/14/21  1419   AST 40*   ALT 28   BILITOT 0.5   ALKPHOS 62     No results for input(s): INR in the last 72 hours. Recent Labs     12/14/21  1419   TROPONINI <0.01       Urinalysis:      Lab Results   Component Value Date    NITRU Negative 07/08/2017    BLOODU Negative 07/08/2017    SPECGRAV 1.023 07/08/2017    GLUCOSEU Negative 07/08/2017       Radiology:   XR CHEST PORTABLE   Final Result   Multifocal airspace disease, given history of COVID, this is consistent with   COVID pneumonia.              ASSESSMENT:  Acute respiratory failure with hypoxia  Pneumonia secondary to COVID-19  Diabetes mellitus type 2  Morbid obesity  Hypertension  Hyponatremia    PLAN:  Patient is currently out of the window for remdesivir therapy, Decadron day #1  -Supplemental oxygen therapy, add procalcitonin and treat superimposed bacterial infection if any.  -Follow D-dimer and CRP levels  -Resume home oral hypoglycemic medications plus sliding scale insulin  -Encourage proning      DVT Prophylaxis: Lovenox  Diet: ADULT DIET; Regular; 4 carb choices (60 gm/meal)  Code Status: Full Code    Dispo -admit to [facility] on telemetry      Thank you for the opportunity to be involved in this patient's care.       (Please note that portions of this note were completed with a voice recognition program. Efforts were made to edit the dictations but occasionally words are mis-transcribed.)

## 2021-12-15 NOTE — CARE COORDINATION
Case Management Assessment  Initial Evaluation      Patient Name: Diogenes Whaley  YOB: 1965  Diagnosis: Acute respiratory failure with hypoxia ([facility] [address] [J96.01]  Pneumonia due to COVID-19 virus [U07.1, J12.82]  COVID-19 [U07.1]  Date / Time: 12/14/2021  1:48 PM    Admission status/Date:12/14/2021  Chart Reviewed: Yes      Patient Interviewed: Yes   Family Interviewed:  No      Hospitalization in the last 30 days:  No      Health Care Decision Maker :   Primary Decision Maker: April Castro - Spouse - [phone]    (CM - must 1st enter selection under Navigator - emergency contact- Health Care Decision Maker Relationship and pick relationship)   Who do you trust or have selected to make healthcare decisions for you      Met with: pt  Interview conducted  (bedside/phone):telephone    Current PCP:   Marcellus Moss required for SNF : Y          3 night stay required - [employer]  Support Systems/Care Needs: Spouse/Significant Other, [employer]  Transportation: self    Meal Preparation: self    Housing  Living Arrangements: lives in 1 story house with spouse  Steps: 0  Intent for return to present living arrangements: Yes  Identified Issues:     [address] with [address] : No Agency:(Services)  Type of Home Care Services: None  Passport/Waiver : No  :                      Phone Number:    Passport/Waiver Services:           Durable Medical Equiptment   DME Provider: none  Equipment: none  Walker___Cane___RTS___ BSC___Shower Chair___Hospital Bed___W/C____Other________  02 at ____Liter(s)---wears(frequency)_______ HHN ___ CPAP___ BiPap___   N/A____      Home O2 Use :  No    If No for home O2---if presently on O2 during hospitalization:  Yes  if yes CM to follow for potential DC O2 need    Community Service Affiliation  Dialysis:  No    · Agency:  · Location:  · Dialysis Schedule:  · Phone:   · Fax:     Other Community Services:none     DISCHARGE PLAN: Explained Case Management role/services. Chart reviewed. Pt is C19+ on 5L. Spoke with pt via telephone. He is from house with spouse and plans to return. Pt is [employer] [facility] [employer]. Will follow for possible home O2 needs.

## 2021-12-15 NOTE — ACP (ADVANCE CARE PLANNING)
Advance Care Planning   Healthcare Decision Maker:    Primary Decision Maker: April Castro [facility] - [phone]    Click here to complete Healthcare Decision Makers including selection of the Healthcare Decision Maker Relationship (ie \"Primary\").

## 2021-12-15 NOTE — CONSULTS
Pharmacy Consult for Tocilizumab Initiation per Dr. Phyllis Aguero per [address] [facility]  **All criteria need to be met to receive   Tocilizumab for COVID-19 patients**   Age    >22 years old   Yes   Ordering Provider    Restricted to ID, intensivists, or pulmonology  Hospitalist may order in ID absence      Yes   Laboratory Results    Confirmed positive COVID-19  CRP >75 mg/L     C-REACTIVE PROTEIN:  No results found for: CRPHS  181.6     Clinical Status       Within 7 days of symptom onset (< 7 days) OR   Within 2 days of hospital admission OR  Within 24 hours of mechanical ventilation       yes   Concomitant Therapy    Receiving systemic steroids for treatment of COVID 19     yes   Oxygen Status     <92% OR requiring supplemental oxygen    Yes     Contraindications    1. Invasive active mycobacterial or fungal infection  2. Platelet <563,667 or active bleeding  4. Significant immunosuppression    ? None     Dosing Recommendations:  (One dose maximum)    Dose when compounding from SQ vial:  Patient weight = 130.6 kg.  810 mg (5 syringes): Patient weight >90 kg x 1 dose   SrCr = 0.7;  CrCl = 174 ml/min.   ALT = 25;  range = 10-40  AST = 32; range = 15-37      Thank you for the consult,  CECIL McclellanPh.12/15/317266:01 AM

## 2021-12-15 NOTE — PROGRESS NOTES
Pt A/Ox4. Pt unlabored, respirations even & easy. No distress noted. 3 L/min via NC in place. Admission questions complete. See flowsheet. PM meds given. See MAR. Denies needs at this time. Bed in low position. Call light within reach.

## 2021-12-15 NOTE — FLOWSHEET NOTE
AM assessment completed. See flowsheet. A/Ox4. Lung sounds clear and diminished in all fields. Patient reports occasional non-productive cough. Medications taken without difficulty. BS active x4. No c/o n/v/d. Cont of B&B. No edema noted. No c/o pain/discomfort at this time. Bed locked and in low position. Call light in reach.      12/15/21 0806   Vital Signs   Temp 97.7 °F (36.5 °C)   Temp Source Oral   Pulse 81   Heart Rate Source Monitor   Resp 18   BP (!) 148/80   BP Location Right upper arm   Patient Position Semi fowlers   Level of Consciousness Alert (0)   MEWS Score 1   Patient Currently in Pain Denies   Pain Assessment   Pain Assessment 0-10   Pain Level 0   Oxygen Therapy   SpO2 94 %   O2 Device Nasal cannula   O2 Flow Rate (L/min) 5 L/min

## 2021-12-16 LAB
C-REACTIVE PROTEIN: 79.9 MG/L (ref 0–5.1)
GLUCOSE BLD-MCNC: 244 MG/DL (ref 70–99)
GLUCOSE BLD-MCNC: 273 MG/DL (ref 70–99)
GLUCOSE BLD-MCNC: 274 MG/DL (ref 70–99)
GLUCOSE BLD-MCNC: 302 MG/DL (ref 70–99)
PERFORMED ON: ABNORMAL

## 2021-12-16 PROCEDURE — 36415 COLL VENOUS BLD VENIPUNCTURE: CPT

## 2021-12-16 PROCEDURE — 99233 SBSQ HOSP IP/OBS HIGH 50: CPT | Performed by: INTERNAL MEDICINE

## 2021-12-16 PROCEDURE — 6370000000 HC RX 637 (ALT 250 FOR IP): Performed by: PHYSICIAN ASSISTANT

## 2021-12-16 PROCEDURE — 6360000002 HC RX W HCPCS: Performed by: PHYSICIAN ASSISTANT

## 2021-12-16 PROCEDURE — 2580000003 HC RX 258: Performed by: PHYSICIAN ASSISTANT

## 2021-12-16 PROCEDURE — 6370000000 HC RX 637 (ALT 250 FOR IP): Performed by: INTERNAL MEDICINE

## 2021-12-16 PROCEDURE — 86140 C-REACTIVE PROTEIN: CPT

## 2021-12-16 PROCEDURE — 1200000000 HC SEMI PRIVATE

## 2021-12-16 RX ORDER — INSULIN GLARGINE 100 [IU]/ML
20 INJECTION, SOLUTION SUBCUTANEOUS NIGHTLY
Status: DISCONTINUED | OUTPATIENT
Start: 2021-12-16 | End: 2021-12-17 | Stop reason: HOSPADM

## 2021-12-16 RX ADMIN — ENOXAPARIN SODIUM 30 MG: 100 INJECTION SUBCUTANEOUS at 08:40

## 2021-12-16 RX ADMIN — Medication 10 ML: at 22:44

## 2021-12-16 RX ADMIN — INSULIN LISPRO 9 UNITS: 100 INJECTION, SOLUTION INTRAVENOUS; SUBCUTANEOUS at 12:08

## 2021-12-16 RX ADMIN — METFORMIN HYDROCHLORIDE 1000 MG: 500 TABLET ORAL at 17:22

## 2021-12-16 RX ADMIN — Medication 10 ML: at 08:46

## 2021-12-16 RX ADMIN — ENOXAPARIN SODIUM 30 MG: 100 INJECTION SUBCUTANEOUS at 22:43

## 2021-12-16 RX ADMIN — INSULIN GLARGINE 20 UNITS: 100 INJECTION, SOLUTION SUBCUTANEOUS at 22:45

## 2021-12-16 RX ADMIN — AMOXICILLIN 500 MG: 500 CAPSULE ORAL at 22:43

## 2021-12-16 RX ADMIN — CETIRIZINE HYDROCHLORIDE 10 MG: 10 TABLET ORAL at 08:40

## 2021-12-16 RX ADMIN — METFORMIN HYDROCHLORIDE 1000 MG: 500 TABLET ORAL at 08:40

## 2021-12-16 RX ADMIN — AMOXICILLIN 500 MG: 500 CAPSULE ORAL at 08:40

## 2021-12-16 RX ADMIN — ZINC SULFATE 220 MG (50 MG) CAPSULE 50 MG: CAPSULE at 08:40

## 2021-12-16 RX ADMIN — DEXAMETHASONE 6 MG: 4 TABLET ORAL at 08:41

## 2021-12-16 RX ADMIN — AMOXICILLIN 500 MG: 500 CAPSULE ORAL at 14:48

## 2021-12-16 RX ADMIN — INSULIN LISPRO 9 UNITS: 100 INJECTION, SOLUTION INTRAVENOUS; SUBCUTANEOUS at 17:25

## 2021-12-16 RX ADMIN — GUAIFENESIN AND DEXTROMETHORPHAN 5 ML: 100; 10 SYRUP ORAL at 17:24

## 2021-12-16 RX ADMIN — INSULIN LISPRO 12 UNITS: 100 INJECTION, SOLUTION INTRAVENOUS; SUBCUTANEOUS at 08:48

## 2021-12-16 RX ADMIN — ATORVASTATIN CALCIUM 10 MG: 10 TABLET, FILM COATED ORAL at 08:40

## 2021-12-16 RX ADMIN — Medication 500 UNITS: at 08:41

## 2021-12-16 RX ADMIN — OXYCODONE HYDROCHLORIDE AND ACETAMINOPHEN 250 MG: 500 TABLET ORAL at 08:41

## 2021-12-16 RX ADMIN — Medication 2000 UNITS: at 08:40

## 2021-12-16 RX ADMIN — GUAIFENESIN AND DEXTROMETHORPHAN 5 ML: 100; 10 SYRUP ORAL at 08:41

## 2021-12-16 NOTE — PROGRESS NOTES
Progress Note    Admit Date:  12/14/2021    Subjective:  Mr. Adam Pena feels about the same today. He is 90% on 6 L of oxygen. He has received Tocilizumab. Denies any chest pain. Does have coughing. No fever chills. Objective:   BP (!) 154/85   Pulse 74   Temp 97 °F (36.1 °C) (Oral)   Resp 16   Ht 6' 4\" (1.93 m)   Wt 288 lb (130.6 kg)   SpO2 95%   BMI 35.06 kg/m²          Intake/Output Summary (Last 24 hours) at 12/16/2021 0756  Last data filed at 12/16/2021 0300  Gross per 24 hour   Intake 1095.09 ml   Output 500 ml   Net 595.09 ml       Physical Exam:    General appearance: alert, appears stated age and cooperative/ill-appearing  Head: Normocephalic, without obvious abnormality, atraumatic  Eyes: conjunctivae/corneas clear. PERRL, EOM's intact. Neck: no adenopathy, no carotid bruit, no JVD, supple, symmetrical, trachea midline and thyroid not enlarged, symmetric, no tenderness/mass/nodules  Lungs: Minimal accessory muscle use. Bibasilar crackles. No wheezes or rhonchi.   Heart: regular rate and rhythm, S1, S2 normal, no murmur, click, rub or gallop  Abdomen: soft, non-tender; bowel sounds normal; no masses,  no organomegaly  Extremities: extremities normal, atraumatic, no cyanosis or edema  Pulses: 2+ and symmetric  Skin: Skin color, texture, turgor normal. No rashes or lesions  Neurologic: Grossly normal    Scheduled Meds:   metFORMIN  1,000 mg Oral BID WC    amoxicillin  500 mg Oral TID    vitamin C  250 mg Oral Daily    atorvastatin  10 mg Oral Daily    empagliflozin  1 tablet Oral Daily    cetirizine  10 mg Oral Daily    Vitamin D  500 Units Oral Daily    zinc sulfate  50 mg Oral Daily    sodium chloride flush  5-40 mL IntraVENous 2 times per day    enoxaparin  30 mg SubCUTAneous BID    dexamethasone  6 mg Oral Daily    Vitamin D  2,000 Units Oral Daily    insulin lispro  0-12 Units SubCUTAneous TID WC    insulin lispro  0-6 Units SubCUTAneous Nightly       Continuous Infusions:   sodium chloride Stopped (12/15/21 1708)    dextrose         PRN Meds:  sodium chloride flush, sodium chloride, ondansetron **OR** ondansetron, polyethylene glycol, acetaminophen **OR** acetaminophen, glucose, dextrose, glucagon (rDNA), dextrose, guaiFENesin-dextromethorphan      Data:  CBC:   Recent Labs     12/14/21  1419 12/15/21  0536   WBC 8.0 4.8   HGB 15.0 15.3   HCT 45.0 47.0   MCV 90.2 91.2    143     BMP:   Recent Labs     12/14/21  1419 12/15/21  0536   * 139   K 4.3 4.9   CL 97* 101   CO2 22 21   BUN 17 20   CREATININE 0.8* 0.7*     LIVER PROFILE:   Recent Labs     12/14/21  1419 12/15/21  0536   AST 40* 32   ALT 28 25   BILITOT 0.5 0.4   ALKPHOS 62 61     PT/INR: No results for input(s): PROTIME, INR in the last 72 hours. Cultures:   Results for Kay Gooden (MRN [id number]) as of 12/15/2021 08:51   Ref. Range 12/14/2021 14:54   INFLUENZA A Latest Ref Range: NOT DETECTED  NOT DETECTED   INFLUENZA B Latest Ref Range: NOT DETECTED  NOT DETECTED   SARS-CoV-2 RNA, RT PCR Latest Ref Range: NOT DETECTED  DETECTED (A)     XR CHEST PORTABLE   Final Result   Multifocal airspace disease, given history of COVID, this is consistent with   COVID pneumonia. Assessment:  Principal Problem:    Acute respiratory failure due to COVID-19 [facility])  Active Problems:    Type 2 diabetes mellitus with hyperglycemia, without long-term current use of insulin (HCC)    Hyperlipidemia, mixed    Pneumonia due to COVID-19 virus    COVID-19  Resolved Problems:    * No resolved hospital problems. *      Plan:    #Acute respiratory failure due to COVID-19. He was 89% on 5 L recently up titrated him to 6 L. Continue Decadron. He is outside the window for remdesivir. He received Tocilizumab on 12/15/2021. He met criteria because of increasing oxygen demands and elevated CRP. We talked about manual proning. Unable to lay on his stomach.   He has been trying to lay on his side his procalcitonin is mildly elevated. He is presently on amoxicillin. We will continue for now. #COVID-19 pneumonia. As above. He is unvaccinated. #Diabetes type 2. Use sliding scale insulin. Add Lantus and change to high dose SSI. Continue home regimen. Sugars elevated due to Decadron. #Lovenox 30 twice daily for DVT prophylaxis. #On Lipitor for hyperlipidemia      IMPORTANT: Please note that some portions of this note may have been created using Dragon voice recognition software. Some \"sound-alike\" and totally wrong word substitutions may have taken place due to known inherent limitations of any such software, including this voice recognition software. In spite of efforts to eliminate such errors, some may not have been corrected. So please read the note with this in mind and recognize such mistakes and understand the correct version using the  context. If there are still uncertainties in the mind of the medical provider reading this note about any aspect of the note, the provider can feel free to contact me. Thanks.      Payal Vaughan MD, MD 12/16/2021 7:56 AM

## 2021-12-16 NOTE — PROGRESS NOTES
Handoff report and transfer of care given at bedside to [address] [place] [address], RN and Syl Morejon RN. Patient in stable condition, denies needs/concerns at this time. Call light within reach.

## 2021-12-16 NOTE — FLOWSHEET NOTE
12/16/21 0813   Vital Signs   Temp 97.2 °F (36.2 °C)   Temp Source Oral   Pulse 75   Heart Rate Source Monitor   Resp 18   BP (!) 155/82   BP Location Right upper arm   Patient Position High fowlers   Level of Consciousness Alert (0)   MEWS Score 1   Patient Currently in Pain Denies   Oxygen Therapy   SpO2 (!) 89 %   O2 Device Nasal cannula   O2 Flow Rate (L/min) 6 L/min     AM assessment completed and vital signs completed, see flowsheet. . Patient is alert & oriented. No complaints voiced. Patient denies further needs. Side rails up X 2. Bed in the lowest position. Call light within reach.

## 2021-12-16 NOTE — PLAN OF CARE
Problem: Gas Exchange - Impaired  Goal: Absence of hypoxia  Outcome: Ongoing     Problem: Fatigue  Goal: Verbalize increase energy and improved vitality  Outcome: Ongoing     Problem: Patient Education: Go to Patient Education Activity  Goal: Patient/Family Education  Outcome: Ongoing

## 2021-12-16 NOTE — PROGRESS NOTES
Pt was notebly diaphoretic, pt O2 increased to 7L. SPO2 is 92%    Update 1309 : Pt's O2 saturation is 96% on 5L.

## 2021-12-16 NOTE — PLAN OF CARE
Problem: Airway Clearance - Ineffective  Goal: Achieve or maintain patent airway  12/15/2021 2240 by Garry Tapia RN  Outcome: Ongoing  12/15/2021 1133 by Vladimir Bain RN  Outcome: Ongoing     Problem: Gas Exchange - Impaired  Goal: Absence of hypoxia  12/15/2021 2240 by Garry Tapia RN  Outcome: Ongoing  12/15/2021 1133 by Vladimir Bain RN  Outcome: Ongoing  Goal: Promote optimal lung function  12/15/2021 2240 by Garry Tapia RN  Outcome: Ongoing  12/15/2021 1133 by Vladimir Bain RN  Outcome: Ongoing     Problem: Breathing Pattern - Ineffective  Goal: Ability to achieve and maintain a regular respiratory rate  12/15/2021 2240 by Garry Tapia RN  Outcome: Ongoing  12/15/2021 1133 by Vladimir Bain RN  Outcome: Ongoing     Problem:  Body Temperature -  Risk of, Imbalanced  Goal: Ability to maintain a body temperature within defined limits  12/15/2021 2240 by Garry Tapia RN  Outcome: Ongoing  12/15/2021 1133 by Vladimir Bain RN  Outcome: Ongoing  Goal: Will regain or maintain usual level of consciousness  12/15/2021 2240 by Garry Tapia RN  Outcome: Ongoing  12/15/2021 1133 by Vladimir Bain RN  Outcome: Ongoing  Goal: Complications related to the disease process, condition or treatment will be avoided or minimized  12/15/2021 2240 by Garry Tapia RN  Outcome: Ongoing  12/15/2021 1133 by Vladimir Bain RN  Outcome: Ongoing     Problem: Isolation Precautions - Risk of Spread of Infection  Goal: Prevent transmission of infection  12/15/2021 2240 by Garry Tapia RN  Outcome: Ongoing  12/15/2021 1133 by Vladimir Bain RN  Outcome: Ongoing     Problem: Nutrition Deficits  Goal: Optimize nutritional status  12/15/2021 2240 by Garry Tapia RN  Outcome: Ongoing  12/15/2021 1133 by Vladimir Bain RN  Outcome: Ongoing     Problem: Risk for Fluid Volume Deficit  Goal: Maintain normal heart rhythm  12/15/2021 2240 by Garry Tapia RN  Outcome: Ongoing  12/15/2021 1133 by Amy Artis RN  Outcome: Ongoing  Goal: Maintain absence of muscle cramping  12/15/2021 2240 by Patrick Aranda RN  Outcome: Ongoing  12/15/2021 1133 by Amy Artis RN  Outcome: Ongoing  Goal: Maintain normal serum potassium, sodium, calcium, phosphorus, and pH  12/15/2021 2240 by Patrick Aranda RN  Outcome: Ongoing  12/15/2021 1133 by Amy Artis RN  Outcome: Ongoing     Problem: Loneliness or Risk for Loneliness  Goal: Demonstrate positive use of time alone when socialization is not possible  12/15/2021 2240 by Patrick Aranda RN  Outcome: Ongoing  12/15/2021 1133 by Amy Artis RN  Outcome: Ongoing     Problem: Fatigue  Goal: Verbalize increase energy and improved vitality  12/15/2021 2240 by Patrick Aranda RN  Outcome: Ongoing  12/15/2021 1133 by Amy Artis RN  Outcome: Ongoing     Problem: Patient Education: Go to Patient Education Activity  Goal: Patient/Family Education  12/15/2021 2240 by Patrick Aranda RN  Outcome: Ongoing  12/15/2021 1133 by Amy Artis RN  Outcome: Ongoing

## 2021-12-17 ENCOUNTER — TELEPHONE (OUTPATIENT)
Dept: INTERNAL MEDICINE CLINIC | Age: 56
End: 2021-12-17

## 2021-12-17 VITALS
TEMPERATURE: 97.1 F | RESPIRATION RATE: 18 BRPM | WEIGHT: 288 LBS | HEIGHT: 76 IN | OXYGEN SATURATION: 95 % | HEART RATE: 69 BPM | SYSTOLIC BLOOD PRESSURE: 141 MMHG | BODY MASS INDEX: 35.07 KG/M2 | DIASTOLIC BLOOD PRESSURE: 90 MMHG

## 2021-12-17 DIAGNOSIS — E11.65 TYPE 2 DIABETES MELLITUS WITH HYPERGLYCEMIA, WITHOUT LONG-TERM CURRENT USE OF INSULIN (HCC): Primary | ICD-10-CM

## 2021-12-17 LAB
GLUCOSE BLD-MCNC: 206 MG/DL (ref 70–99)
GLUCOSE BLD-MCNC: 226 MG/DL (ref 70–99)
GLUCOSE BLD-MCNC: 262 MG/DL (ref 70–99)
PERFORMED ON: ABNORMAL

## 2021-12-17 PROCEDURE — 6360000002 HC RX W HCPCS: Performed by: PHYSICIAN ASSISTANT

## 2021-12-17 PROCEDURE — 2580000003 HC RX 258: Performed by: PHYSICIAN ASSISTANT

## 2021-12-17 PROCEDURE — 6370000000 HC RX 637 (ALT 250 FOR IP): Performed by: INTERNAL MEDICINE

## 2021-12-17 PROCEDURE — 6370000000 HC RX 637 (ALT 250 FOR IP): Performed by: PHYSICIAN ASSISTANT

## 2021-12-17 PROCEDURE — 99239 HOSP IP/OBS DSCHRG MGMT >30: CPT | Performed by: INTERNAL MEDICINE

## 2021-12-17 RX ORDER — DEXAMETHASONE 6 MG/1
6 TABLET ORAL
Qty: 7 TABLET | Refills: 0 | Status: SHIPPED | OUTPATIENT
Start: 2021-12-17 | End: 2021-12-24

## 2021-12-17 RX ADMIN — Medication 500 UNITS: at 08:15

## 2021-12-17 RX ADMIN — Medication 2000 UNITS: at 08:16

## 2021-12-17 RX ADMIN — ENOXAPARIN SODIUM 30 MG: 100 INJECTION SUBCUTANEOUS at 08:14

## 2021-12-17 RX ADMIN — ATORVASTATIN CALCIUM 10 MG: 10 TABLET, FILM COATED ORAL at 08:13

## 2021-12-17 RX ADMIN — INSULIN LISPRO 6 UNITS: 100 INJECTION, SOLUTION INTRAVENOUS; SUBCUTANEOUS at 08:22

## 2021-12-17 RX ADMIN — AMOXICILLIN 500 MG: 500 CAPSULE ORAL at 08:21

## 2021-12-17 RX ADMIN — Medication 10 ML: at 08:19

## 2021-12-17 RX ADMIN — OXYCODONE HYDROCHLORIDE AND ACETAMINOPHEN 250 MG: 500 TABLET ORAL at 08:13

## 2021-12-17 RX ADMIN — METFORMIN HYDROCHLORIDE 1000 MG: 500 TABLET ORAL at 08:13

## 2021-12-17 RX ADMIN — INSULIN LISPRO 6 UNITS: 100 INJECTION, SOLUTION INTRAVENOUS; SUBCUTANEOUS at 11:48

## 2021-12-17 RX ADMIN — CETIRIZINE HYDROCHLORIDE 10 MG: 10 TABLET ORAL at 08:12

## 2021-12-17 RX ADMIN — DEXAMETHASONE 6 MG: 4 TABLET ORAL at 08:14

## 2021-12-17 RX ADMIN — ZINC SULFATE 220 MG (50 MG) CAPSULE 50 MG: CAPSULE at 08:12

## 2021-12-17 NOTE — DISCHARGE SUMMARY
Name:  Aryan Mason  Room:  7366/5822-18  MRN:    [id number]    Discharge Summary      This discharge summary is in conjunction with a complete physical exam done on the day of discharge. Discharging Physician: Flakito Haskins MD      Admit: 12/14/2021  Discharge:   12/17/2021     Diagnoses this Admission    Principal Problem:    Acute respiratory failure due to COVID-19 [facility])  Active Problems:    Type 2 diabetes mellitus with hyperglycemia, without long-term current use of insulin ([facility])    Hyperlipidemia, mixed    Pneumonia due to COVID-19 virus    COVID-19  Resolved Problems:    * No resolved hospital problems. *        Procedures (Please Review Full Report for Details)  none    Consults    IP CONSULT TO HOSPITALIST  IP CONSULT TO PHARMACY      HPI:      64 y.o. male who presented to the hospital with a chief complaint of worsening shortness of breath. Patient developed symptoms about a 2 weeks ago and tested positive for COVID-19 last week. He was treated with antibiotics without much response. Since then he has been having myalgias, upper respiratory symptoms and congestion. Over the last 4 days however he started to become more short of breath at rest and with exertion. Today his symptoms were so severe that he came to the emergency department to get evaluated. In the ER he was found to be hypoxic on room air. He will be admitted for further medical treatment. Physical Exam at Discharge:  BP (!) 141/90   Pulse 69   Temp 97.1 °F (36.2 °C) (Oral)   Resp 18   Ht 6' 4\" (1.93 m)   Wt 288 lb (130.6 kg)   SpO2 95%   BMI 35.06 kg/m²     General appearance: alert, appears stated age and cooperative/ill-appearing  Head: Normocephalic, without obvious abnormality, atraumatic  Eyes: conjunctivae/corneas clear. PERRL, EOM's intact.   Neck: no adenopathy, no carotid bruit, no JVD, supple, symmetrical, trachea midline and thyroid not enlarged, symmetric, no tenderness/mass/nodules  Lungs: Minimal accessory muscle use. Bibasilar crackles. No wheezes or rhonchi. Heart: regular rate and rhythm, S1, S2 normal, no murmur, click, rub or gallop  Abdomen: soft, non-tender; bowel sounds normal; no masses,  no organomegaly  Extremities: extremities normal, atraumatic, no cyanosis or edema  Pulses: 2+ and symmetric  Skin: Skin color, texture, turgor normal. No rashes or lesions  Neurologic: Grossly normal    Hospital Course    #Acute respiratory failure due to COVID-19. He was 89% on 5 L recently up titrated him to 6 L. Continue Decadron. He was outside the window for remdesivir. He received Tocilizumab on 12/15/2021. He met criteria because of increasing oxygen demands and elevated CRP. We talked about manual proning. Unable to lay on his stomach. He has been trying to lay on his side his procalcitonin is mildly elevated. He is presently on amoxicillin. We will continue for now. Discharge on PO Decadron. Did not need oxygen at discharge.      #COVID-19 pneumonia. As above. He is unvaccinated.     #Diabetes type 2. Use sliding scale insulin. Add Lantus and change to high dose SSI. Continue home regimen. Sugars elevated due to Decadron.     #Lovenox 30 twice daily for DVT prophylaxis.     #On Lipitor for hyperlipidemia    CBC:   Recent Labs     12/14/21  1419 12/15/21  0536   WBC 8.0 4.8   HGB 15.0 15.3   HCT 45.0 47.0   MCV 90.2 91.2    143     BMP:   Recent Labs     12/14/21  1419 12/15/21  0536   * 139   K 4.3 4.9   CL 97* 101   CO2 22 21   BUN 17 20   CREATININE 0.8* 0.7*     LIVER PROFILE:   Recent Labs     12/14/21  1419 12/15/21  0536   AST 40* 32   ALT 28 25   BILITOT 0.5 0.4   ALKPHOS 62 61     Results for Amanda Childs (MRN [id number]) as of 12/17/2021 09:45   Ref.  Range 12/14/2021 14:54   INFLUENZA A Latest Ref Range: NOT DETECTED  NOT DETECTED   INFLUENZA B Latest Ref Range: NOT DETECTED  NOT DETECTED   SARS-CoV-2 RNA, RT PCR Latest Ref Range: NOT DETECTED  DETECTED (A)         XR CHEST PORTABLE   Final Result   Multifocal airspace disease, given history of COVID, this is consistent with   COVID pneumonia. Discharge Medications     Medication List      START taking these medications    dexamethasone 6 MG tablet  Commonly known as: Decadron  Take 1 tablet by mouth daily (with breakfast) for 7 days        CONTINUE taking these medications    atorvastatin 10 MG tablet  Commonly known as: LIPITOR  Take 1 tablet by mouth daily     fish oil 1000 MG Caps     Janumet -1000 MG Tb24  Generic drug: SITagliptin-metFORMIN HCl ER  TAKE 1 TABLET BY MOUTH ONE TIME A DAY     Jardiance 25 MG tablet  Generic drug: empagliflozin  Take 1 tablet by mouth daily     vitamin C 250 MG tablet     vitamin D3 10 MCG (400 UNIT) Tabs tablet  Commonly known as: CHOLECALCIFEROL     Xyzal 5 MG tablet  Generic drug: levocetirizine     ZINC 15 PO        STOP taking these medications    amoxicillin 500 MG capsule  Commonly known as: AMOXIL     Steglatro 15 MG Tabs  Generic drug: Ertugliflozin L-PyroglutamicAc           Where to Get Your Medications      These medications were sent to [id number] [place] [address] [facility], [address] [address]    Phone: [phone]   · dexamethasone 6 MG tablet           Discharge Condition/Location: Stable    Follow Up: Follow up with PCP.     More than 30 mts spent    Alexandra Schaefer MD 12/17/2021 9:45 AM

## 2021-12-17 NOTE — PROGRESS NOTES
Handoff report and transfer of care given at bedside to [facility] and Edward Encarnacion RN. Patient in stable condition, denies needs/concerns at this time. Call light within reach.

## 2021-12-17 NOTE — CARE COORDINATION
DISCHARGE ORDER  Date/Time 2021 12:12 PM  Completed by: Jennifer Giraldo RN, Case Management    Patient Name: Viridiana Key      : 1965  Admitting Diagnosis: Acute respiratory failure with hypoxia ([facility] [address] [J96.01]  Pneumonia due to COVID-19 virus [U07.1, J12.82]  COVID-19 [U07.1]      Admit order Date and Status: 21 inpt  (verify MD's last order for status of admission)      Noted discharge order. If applicable PT/OT recommendation at Discharge: N/A  DME recommendation by PT/OT:N/A  Confirmed discharge plan  : Yes  with whom_______________  If pt confirmed DC plan does family need to be contacted by CM No   Discharge Plan:  Order for dc noted. Spoke with pt via phone and plan remains for return home. Declines needs. Noted O2 sat is 90% on RA with activity. Chart reviewed and no other dc needs identified. Reviewed chart. Role of discharge planner explained and patient verbalized understanding. Discharge order is noted. Has Home O2 in place on admit:  No  Informed of need to bring portable home O2 tank on day of discharge for nursing to connect prior to leaving:   Not Indicated  Verbalized agreement/Understanding:   Not Indicated  Pt is being d/c'd to home today. Pt's O2 sats are 90% on RA with exertion. Discharge timeout done with  Nsg, CM and pt. All discharge needs and concerns addressed.

## 2021-12-17 NOTE — FLOWSHEET NOTE
12/17/21 0800   Vital Signs   Temp 97.1 °F (36.2 °C)   Temp Source Oral   Pulse 69   Heart Rate Source Monitor   Resp 18   BP (!) 141/90   BP Location Right upper arm   Patient Position Sitting   Level of Consciousness Alert (0)   MEWS Score 1   Patient Currently in Pain Denies   Oxygen Therapy   SpO2 95 %   Pulse Oximeter Device Mode Continuous   Pulse Oximeter Device Location Right; Finger   O2 Device Nasal cannula   Skin Assessment Clean, dry, & intact   O2 Flow Rate (L/min) 5 L/min     AM assessment completed and vital signs completed, see flowsheet. Patient is alert & oriented. No complaints voiced. Patient denies further needs. Side rails up X 2. Bed in the lowest position. Call light within reach.

## 2021-12-17 NOTE — PROGRESS NOTES
PHARMACY NOTE  Paddy Quijano was ordered Jardiance. Per the [address], this medication is non-formulary and not stocked by pharmacy. Patient to be discharged today, order discontinued. The medication can be reordered at discharge.    CECIL RomeroPh.12/17/202112:23 PM

## 2021-12-17 NOTE — TELEPHONE ENCOUNTER
Accoring to [facility] notes he is supposed to be talking empagliflozin (JARDIANCE) 25 MG tablet     Needs to be to filled   [facility] [address] [place] [address] Dr Aby Mercer [phone] F [phone]      Please advise

## 2021-12-17 NOTE — PROGRESS NOTES
IV removed and discharge instructions completed. All questions were answered to patients satisfaction. Request for transport placed, all personal belongs packed. No further needs noted.

## 2021-12-17 NOTE — PLAN OF CARE
Problem: Airway Clearance - Ineffective  Goal: Achieve or maintain patent airway  Outcome: Ongoing     Problem: Gas Exchange - Impaired  Goal: Absence of hypoxia  12/17/2021 0120 by Giana Wade RN  Outcome: Ongoing  12/16/2021 1304 by Maia Mcclure RN  Outcome: Ongoing  Goal: Promote optimal lung function  Outcome: Ongoing     Problem: Breathing Pattern - Ineffective  Goal: Ability to achieve and maintain a regular respiratory rate  Outcome: Ongoing     Problem:  Body Temperature -  Risk of, Imbalanced  Goal: Ability to maintain a body temperature within defined limits  Outcome: Ongoing  Goal: Will regain or maintain usual level of consciousness  Outcome: Ongoing  Goal: Complications related to the disease process, condition or treatment will be avoided or minimized  Outcome: Ongoing     Problem: Isolation Precautions - Risk of Spread of Infection  Goal: Prevent transmission of infection  Outcome: Ongoing     Problem: Nutrition Deficits  Goal: Optimize nutritional status  Outcome: Ongoing     Problem: Risk for Fluid Volume Deficit  Goal: Maintain normal heart rhythm  Outcome: Ongoing  Goal: Maintain absence of muscle cramping  Outcome: Ongoing  Goal: Maintain normal serum potassium, sodium, calcium, phosphorus, and pH  Outcome: Ongoing     Problem: Loneliness or Risk for Loneliness  Goal: Demonstrate positive use of time alone when socialization is not possible  Outcome: Ongoing     Problem: Fatigue  Goal: Verbalize increase energy and improved vitality  12/17/2021 0120 by Giana Wade RN  Outcome: Ongoing  12/16/2021 1304 by Maia Mcclure RN  Outcome: Ongoing     Problem: Patient Education: Go to Patient Education Activity  Goal: Patient/Family Education  12/17/2021 0120 by Giana Wade RN  Outcome: Ongoing  12/16/2021 1304 by Maia Mcclure RN  Outcome: Ongoing

## 2021-12-20 ENCOUNTER — CARE COORDINATION (OUTPATIENT)
Dept: CASE MANAGEMENT | Age: 56
End: 2021-12-20

## 2021-12-20 DIAGNOSIS — U07.1 COVID-19: Primary | ICD-10-CM

## 2021-12-20 RX ORDER — GLUCOSAMINE HCL/CHONDROITIN SU 500-400 MG
CAPSULE ORAL
Qty: 100 STRIP | Refills: 0 | Status: SHIPPED | OUTPATIENT
Start: 2021-12-20

## 2021-12-20 RX ORDER — LANCETS 30 GAUGE
1 EACH MISCELLANEOUS DAILY
Qty: 100 EACH | Refills: 0 | Status: SHIPPED | OUTPATIENT
Start: 2021-12-20

## 2021-12-20 RX ORDER — EMPAGLIFLOZIN 25 MG/1
1 TABLET, FILM COATED ORAL DAILY
Qty: 90 TABLET | Refills: 0 | Status: SHIPPED | OUTPATIENT
Start: 2021-12-20 | End: 2021-12-23

## 2021-12-20 NOTE — CARE COORDINATION
Patient contacted regarding COVID-19 diagnosis. Discussed COVID-19 related testing which was available at this time. COVID-19 Detected on 2021. Patient informed of results, if available? Yes    Call within 2 business days of discharge: Yes  Discharge Date: 21   RARS: Readmission Risk Score: 7.5 ( )    Was this an external facility discharge? No Discharge Facility: NA    Care Transition Nurse contacted the family by telephone to perform post discharge assessment. Verified name and  with family as identifiers. Provided introduction to self, and explanation of the CTN role, and reason for call due to risk factors for infection and/or exposure to COVID-19. Symptoms reviewed with family who verbalized the following symptoms: no new symptoms and no worsening symptoms. Due to no new or worsening symptoms encounter was not routed to provider for escalation. Discussed follow-up appointments. If no appointment was previously scheduled, appointment scheduling offered: wife scheduled already  [facility] follow up appointment(s):   Future Appointments   Date Time Provider Syl Garcia   [facility] 10:[facility]  [place] [facility]   2022 11:00 AM [facility]  [place] [facility] [place]     Non-[facility] follow up appointment(s): NA    Non-face-to-face services provided:  Obtained and reviewed discharge summary and/or continuity of care documents  Education of patient/family/caregiver/guardian to support self-management-s/s monitor  Assessment and support for treatment adherence and medication management-[id number] completed    Advance Care Planning:   Does patient have an Advance Directive:  decision maker updated. Educated patient about risk for severe COVID-19 due to risk factors according to CDC guidelines. CTN reviewed discharge instructions, medical action plan and red flag symptoms family who verbalized understanding.  Discussed COVID vaccination status No. Education provided on COVID-19 vaccination as appropriate. Family was given an opportunity to verbalize any questions and concerns and agrees to contact the CTN or health care provider for questions related to their healthcare. Reviewed and educated family on any new and changed medications related to discharge diagnosis. Was patient discharged with a pulse oximeter? Has one. CTN discussed pulse oximeter instructions and when to notify provider or seek emergency care. 1st attempt - Unable to reach patient by phone at this time. Message left including CTN contact info for return call. Wife Lacho Done returned call with Candelario Favre present. States he has not been taking Jardiance. [facility] PCP was sending samples of different oral antidiabetic medications from office. They get Janumet from [facility] 90-days supply. Prior to hospitalization for COVID-19 he was taking the Janumet and Stelatro only. The latter was discontinued at discharge without explanation. He has not checked BS since home. States she has a sample box of Farxigo at home as well. Needs instruction on what to do to control BS and A1C. Stressed importance of knowing what BS is in order to make decision. Not sure she has kit as he was not testing BS at home previously. Requests order for new glucometer kit, supplies including lancets and test strips. prefererd pharmacy is [facility] He denies n/v/d, tolerating PO. [facility] goal with DM meds was weight loss. Checked SaO2 and 91-93% RA during call. Reviewed pursed lip breathing, SaO2 goals. Wife feels well without COVID symptoms. CTN routing message to PCP for recommendation and requesting glucomter testing kit and supplies. Denies needs otherwise. CTN provided contact information for future needs. Note routed to PCP office at this time.     Jennifer Velázquez, RN  Care Transitions Nurse  [phone] mobile

## 2021-12-20 NOTE — CARE COORDINATION
1615 - CTN received no response from PCP office regarding recommendation for diabetic meds or glucometer rx request. Outreach by phone to PCP office at this time. MA confirms she has message and will review this afternoon. CTN updated patient and spouse and will follow up in the morning.      Renae Joyner, RN  Care Transition Nurse  [phone] mobile     Future Appointments   Date Time Provider Syl oStoisti   12/23/2021 10:30 AM MD ROSALBA Smith [address] [facility] [place] - [place]   1/28/2022 11:00 AM MD ROSALBA Santana [address] [facility] [place] - [place]

## 2021-12-21 NOTE — CARE COORDINATION
Noted prescriptions sent. Updated spouse. She is aware. [facility] pharmacy has already contacted her. The Jardiance will need to be ordered in. Reports he is doing well today. Took a shower this morning and about to eat breakfast. Hopefully will have a few BS readings to review at OV in 2 days as below. Denies needs at this time. Has CTN contact info for prn needs. CTN will follow up next week.      Mor Will RN    Future Appointments   Date Time Provider Syl Garcia   12/23/2021 10:30 AM MD ROSALBA Hopkins [address] [facility] [place] - [place]   1/28/2022 11:00 AM MD ROSALBA Salamanca [address] [facility] [place] - [place]

## 2021-12-23 ENCOUNTER — OFFICE VISIT (OUTPATIENT)
Dept: INTERNAL MEDICINE CLINIC | Age: 56
End: 2021-12-23
Payer: COMMERCIAL

## 2021-12-23 VITALS
BODY MASS INDEX: 33.36 KG/M2 | HEIGHT: 76 IN | TEMPERATURE: 98 F | WEIGHT: 274 LBS | SYSTOLIC BLOOD PRESSURE: 142 MMHG | DIASTOLIC BLOOD PRESSURE: 90 MMHG

## 2021-12-23 DIAGNOSIS — U07.1 COVID-19: ICD-10-CM

## 2021-12-23 DIAGNOSIS — U07.1 ACUTE RESPIRATORY FAILURE DUE TO COVID-19 (HCC): ICD-10-CM

## 2021-12-23 DIAGNOSIS — J96.00 ACUTE RESPIRATORY FAILURE DUE TO COVID-19 (HCC): ICD-10-CM

## 2021-12-23 DIAGNOSIS — J12.82 PNEUMONIA DUE TO COVID-19 VIRUS: ICD-10-CM

## 2021-12-23 DIAGNOSIS — U07.1 PNEUMONIA DUE TO COVID-19 VIRUS: ICD-10-CM

## 2021-12-23 DIAGNOSIS — E11.65 TYPE 2 DIABETES MELLITUS WITH HYPERGLYCEMIA, WITHOUT LONG-TERM CURRENT USE OF INSULIN (HCC): ICD-10-CM

## 2021-12-23 LAB
CHP ED QC CHECK: NORMAL
GLUCOSE BLD-MCNC: 191 MG/DL

## 2021-12-23 PROCEDURE — 82962 GLUCOSE BLOOD TEST: CPT | Performed by: INTERNAL MEDICINE

## 2021-12-23 PROCEDURE — 99496 TRANSJ CARE MGMT HIGH F2F 7D: CPT | Performed by: INTERNAL MEDICINE

## 2021-12-23 PROCEDURE — 1111F DSCHRG MED/CURRENT MED MERGE: CPT | Performed by: INTERNAL MEDICINE

## 2021-12-23 RX ORDER — ERTUGLIFLOZIN 15 MG/1
1 TABLET, FILM COATED ORAL DAILY
Qty: 90 TABLET | Refills: 3
Start: 2021-12-23

## 2021-12-23 RX ORDER — SITAGLIPTIN AND METFORMIN HYDROCHLORIDE 100; 1000 MG/1; MG/1
TABLET, FILM COATED, EXTENDED RELEASE ORAL
Qty: 90 TABLET | Refills: 3 | Status: SHIPPED | OUTPATIENT
Start: 2021-12-23 | End: 2022-03-01 | Stop reason: SDUPTHER

## 2021-12-23 ASSESSMENT — ENCOUNTER SYMPTOMS
ABDOMINAL PAIN: 0
CHEST TIGHTNESS: 0
SHORTNESS OF BREATH: 1

## 2021-12-23 NOTE — ASSESSMENT & PLAN NOTE
Continues to refuse vaccination -told me he \"didn't come here for a lecture and god will take care of him\"- I refuse to waste my time counseling this patient any further

## 2021-12-23 NOTE — PROGRESS NOTES
Post-Discharge Transitional Care Management Services or Hospital Follow Up      Tiera Pham   YOB: 1965    Date of Office Visit:  12/23/2021  Date of Hospital Admission: 12/14/21  Date of Hospital Discharge: 12/17/21  Readmission Risk Score(high >=14%. Medium >=10%):Readmission Risk Score: 7.5 ( )      Care management risk score Rising risk (score 2-5) and Complex Care (Scores >=6): 1     Non face to face  following discharge, date last encounter closed (first attempt may have been earlier): 12/20/2021 10:58 AM 12/20/2021 10:58 AM    Call initiated 2 business days of discharge: Yes     Patient Active Problem List   Diagnosis    Type 2 diabetes mellitus with hyperglycemia, without long-term current use of insulin (HCC)    [facility] [address]    Hyperlipidemia, mixed    Carbuncle of back    Carpal tunnel syndrome on right    Elevated transaminase level    Pneumonia due to COVID-19 virus    Acute respiratory failure due to COVID-19 ([facility] [address]    Hyperlipidemia    COVID-19       No Known Allergies    Medications listed as ordered at the time of discharge from hospital     Medication List          Accurate as of December 23, 2021 11:13 AM. If you have any questions, ask your nurse or doctor. CONTINUE taking these medications    atorvastatin 10 MG tablet  Commonly known as: LIPITOR  Take 1 tablet by mouth daily     blood glucose monitor kit and supplies  Dispense sufficient amount for indicated testing frequency plus additional to accommodate PRN testing needs. Dispense all needed supplies to include: monitor, strips, lancing device, lancets, control solutions, alcohol swabs. blood glucose test strips  Test 1 times a day & as needed for symptoms of irregular blood glucose. Dispense sufficient amount for indicated testing frequency plus additional to accommodate PRN testing needs.      dexamethasone 6 MG tablet  Commonly known as: Decadron  Take 1 tablet by mouth daily (with breakfast) for 7 days     fish oil 1000 MG Caps     Janumet -1000 MG Tb24  Generic drug: SITagliptin-metFORMIN HCl ER  TAKE 1 TABLET BY MOUTH ONE TIME A DAY     Jardiance 25 MG tablet  Generic drug: empagliflozin  Take 1 tablet by mouth daily     Lancets Misc  1 each by Does not apply route daily     vitamin C 250 MG tablet     vitamin D3 10 MCG (400 UNIT) Tabs tablet  Commonly known as: CHOLECALCIFEROL     Xyzal 5 MG tablet  Generic drug: levocetirizine     ZINC 15 PO              Medications marked \"taking\" at this time  Outpatient Medications Marked as Taking for the 12/23/21 encounter (Office Visit) with Orlando Ortiz MD   Medication Sig Dispense Refill    empagliflozin (JARDIANCE) 25 MG tablet Take 1 tablet by mouth daily 90 tablet 0    blood glucose monitor kit and supplies Dispense sufficient amount for indicated testing frequency plus additional to accommodate PRN testing needs. Dispense all needed supplies to include: monitor, strips, lancing device, lancets, control solutions, alcohol swabs. 1 kit 0    Lancets MISC 1 each by Does not apply route daily 100 each 0    blood glucose monitor strips Test 1 times a day & as needed for symptoms of irregular blood glucose. Dispense sufficient amount for indicated testing frequency plus additional to accommodate PRN testing needs.  100 strip 0    dexamethasone (DECADRON) 6 MG tablet Take 1 tablet by mouth daily (with breakfast) for 7 days 7 tablet 0    SITagliptin-metFORMIN HCl ER (JANUMET XR) 100-1000 MG TB24 TAKE 1 TABLET BY MOUTH ONE TIME A DAY 90 tablet 3    levocetirizine (XYZAL) 5 MG tablet Take 5 mg by mouth nightly      Ascorbic Acid (VITAMIN C) 250 MG tablet Take 250 mg by mouth daily      Omega-3 Fatty Acids (FISH OIL) 1000 MG CAPS Take 1,000 mg by mouth daily       atorvastatin (LIPITOR) 10 MG tablet Take 1 tablet by mouth daily 90 tablet 3    Zinc Sulfate (ZINC 15 PO) Take by mouth      vitamin D3 (CHOLECALCIFEROL) 10 MCG (400 UNIT) TABS tablet Take 400 Units by mouth daily          Medications patient taking as of now reconciled against medications ordered at time of hospital discharge: Yes    Chief Complaint   Patient presents with    Follow-Up from Hospital     follow up covid pne       Discharged last Friday after an episode of covid in unvaccinated patient. Still feeling weak. Pulse ox levels are in the mid 90's. Has lost a lot of weight. Steroids are causing elevated sugars and keeping him awake at night. His previous SGLT-2 was not on hospital formulary so he was switched but he feels old one worked better. Feels he has lost a lot of muscle mass. Inpatient course: Discharge summary reviewed- see chart. Interval history/Current status: stable     Review of Systems   Constitutional: Positive for fatigue. Negative for appetite change. HENT: Negative. Respiratory: Positive for shortness of breath. Negative for chest tightness. Cardiovascular: Negative for chest pain and palpitations. Gastrointestinal: Negative for abdominal pain. Genitourinary: Negative. Skin: Negative. Neurological: Positive for weakness. Psychiatric/Behavioral: Negative for dysphoric mood. The patient is not nervous/anxious. Vitals:    12/23/21 1100 12/23/21 1102   BP: (!) 142/90 (!) 142/90   Temp: 98 °F (36.7 °C)    Weight: 274 lb (124.3 kg)    Height: 6' 4\" (1.93 m)      Body mass index is 33.35 kg/m². Wt Readings from Last 3 Encounters:   12/23/21 274 lb (124.3 kg)   12/14/21 288 lb (130.6 kg)   10/29/21 298 lb (135.2 kg)     BP Readings from Last 3 Encounters:   12/23/21 (!) 142/90   12/17/21 (!) 141/90   10/29/21 130/78       Physical Exam  Constitutional:       General: He is not in acute distress. Appearance: He is well-developed. HENT:      Head: Normocephalic and atraumatic.       Right Ear: External ear normal.      Left Ear: External ear normal.   Eyes:      Conjunctiva/sclera: Conjunctivae normal.      Pupils: Pupils are equal, round, and reactive to light. Neck:      Thyroid: No thyroid mass or thyromegaly. Vascular: No carotid bruit. Cardiovascular:      Rate and Rhythm: Normal rate and regular rhythm. Pulses: Normal pulses. Heart sounds: Normal heart sounds. No murmur heard. No gallop. Comments: No carotid bruits noted bilaterally  Pulmonary:      Effort: Pulmonary effort is normal. No respiratory distress. Breath sounds: Rales present. No wheezing or rhonchi. Chest:   Breasts:      Right: No supraclavicular adenopathy. Left: No supraclavicular adenopathy. Abdominal:      General: Bowel sounds are normal.      Palpations: Abdomen is soft. There is no hepatomegaly, splenomegaly or mass. Tenderness: There is no abdominal tenderness. There is no guarding or rebound. Musculoskeletal:         General: Normal range of motion. Cervical back: Normal range of motion and neck supple. Lymphadenopathy:      Cervical: No cervical adenopathy. Upper Body:      Right upper body: No supraclavicular adenopathy. Left upper body: No supraclavicular adenopathy. Skin:     General: Skin is warm and dry. Findings: No rash. Neurological:      Mental Status: He is alert and oriented to person, place, and time. Cranial Nerves: No cranial nerve deficit. Sensory: No sensory deficit. Deep Tendon Reflexes: Reflexes are normal and symmetric. Psychiatric:         Speech: Speech normal.         Behavior: Behavior normal.         Thought Content:  Thought content normal.         Judgment: Judgment normal.               Assessment and Plan:      Pneumonia due to COVID-19 virus   Resolving slowly -cont to work on slowly increasing activity    COVID-19   Slowly recovering     Type 2 diabetes mellitus with hyperglycemia, without long-term current use of insulin ([facility] [address]       Acute respiratory failure due to COVID-19 [facility])   Continues to refuse vaccination -told me he \"didn't

## 2021-12-23 NOTE — LETTER
[facility] [address]  [address] [address]  Phone: [phone]  Fax: [phone]    Samara Arndt MD        December 23, 2021     Patient: Doneta Severance   YOB: 1965   Date of Visit: 12/23/2021       To Whom It May Concern: It is my medical opinion that Doneta Severance . Due to covid pneumonia patient should stay off work until 1/3/22     If you have any questions or concerns, please don't hesitate to call.     Sincerely,        Samara Arndt MD

## 2021-12-29 ENCOUNTER — CARE COORDINATION (OUTPATIENT)
Dept: CASE MANAGEMENT | Age: 56
End: 2021-12-29

## 2021-12-29 NOTE — CARE COORDINATION
Patient contacted regarding COVID-19 diagnosis. COVID-19 Detected on 12/14/2021. Care Transition Nurse contacted the family by telephone to perform follow-up assessment. Symptoms reviewed with patient who verbalized the following symptoms: no new symptoms and no worsening symptoms. Due to no new or worsening symptoms encounter was not routed to provider for escalation. Spouse was given an opportunity to verbalize any questions and concerns and agrees to contact the CTN or health care provider for questions related to their healthcare. Was patient discharged with a pulse oximeter? Yes CTN reviewed pulse oximeter instructions and when to notify provider or seek emergency care. Steroids complete. BS trending down and in better control. SaO2 95% RA. Completed OV with PCP associate. Denies needs/concerns at this time. CTN provided contact information for future reference. No further CTN outreach scheduled based on severity of symptoms and risk factors.     Yanick Dodson RN  Care Transition Nurse  [phone] mobile    Future Appointments   Date Time Provider Syl Garcia   1/28/2022 11:00 AM MD ROSALBA Fisher [address]  [place]

## 2022-01-06 ENCOUNTER — TELEPHONE (OUTPATIENT)
Dept: INTERNAL MEDICINE CLINIC | Age: 57
End: 2022-01-06

## 2022-01-06 NOTE — LETTER
[facility] [address]  [address] [address]  Phone: [phone]  Fax: [phone]    Luis Carlos Healy MD        January 6, 2022     Patient: Rodo Ordaz   YOB: 1965   Date of Visit: 1/6/2022       To Whom It May Concern:    Rodo Ordaz may return to work on 1/10/2022. If you have any questions or concerns, please don't hesitate to call.     Sincerely,        Luis Carlos Healy MD

## 2022-01-06 NOTE — TELEPHONE ENCOUNTER
Patient needs to return to work on Monday   1/10/22. Patient needs a note to return.         Patients wife would like to pick the letter up this afternoon        Please advise and call

## 2022-01-15 ENCOUNTER — HOSPITAL ENCOUNTER (EMERGENCY)
Age: 57
Discharge: HOME | End: 2022-01-15
Attending: EMERGENCY MEDICINE
Payer: COMMERCIAL

## 2022-01-15 VITALS
TEMPERATURE: 97.7 F | HEART RATE: 85 BPM | OXYGEN SATURATION: 95 % | RESPIRATION RATE: 16 BRPM | DIASTOLIC BLOOD PRESSURE: 94 MMHG | SYSTOLIC BLOOD PRESSURE: 157 MMHG

## 2022-01-15 DIAGNOSIS — I80.02 THROMBOPHLEBITIS OF SUPERFICIAL VEINS OF LEFT LOWER EXTREMITY: Primary | ICD-10-CM

## 2022-01-15 LAB
ANION GAP SERPL CALCULATED.3IONS-SCNC: 11 MMOL/L (ref 3–16)
BASOPHILS ABSOLUTE: 0 K/UL (ref 0–0.2)
BASOPHILS RELATIVE PERCENT: 0.6 %
BUN BLDV-MCNC: 16 MG/DL (ref 7–20)
CALCIUM SERPL-MCNC: 8.9 MG/DL (ref 8.3–10.6)
CHLORIDE BLD-SCNC: 104 MMOL/L (ref 99–110)
CO2: 22 MMOL/L (ref 21–32)
CREAT SERPL-MCNC: 0.6 MG/DL (ref 0.9–1.3)
EOSINOPHILS ABSOLUTE: 0.2 K/UL (ref 0–0.6)
EOSINOPHILS RELATIVE PERCENT: 2.6 %
GFR AFRICAN AMERICAN: >60
GFR NON-AFRICAN AMERICAN: >60
GLUCOSE BLD-MCNC: 179 MG/DL (ref 70–99)
HCT VFR BLD CALC: 43.1 % (ref 40.5–52.5)
HEMOGLOBIN: 14.1 G/DL (ref 13.5–17.5)
LYMPHOCYTES ABSOLUTE: 1.4 K/UL (ref 1–5.1)
LYMPHOCYTES RELATIVE PERCENT: 23.9 %
MCH RBC QN AUTO: 29.8 PG (ref 26–34)
MCHC RBC AUTO-ENTMCNC: 32.8 G/DL (ref 31–36)
MCV RBC AUTO: 91.1 FL (ref 80–100)
MONOCYTES ABSOLUTE: 0.9 K/UL (ref 0–1.3)
MONOCYTES RELATIVE PERCENT: 14.5 %
NEUTROPHILS ABSOLUTE: 3.5 K/UL (ref 1.7–7.7)
NEUTROPHILS RELATIVE PERCENT: 58.4 %
PDW BLD-RTO: 16 % (ref 12.4–15.4)
PLATELET # BLD: 131 K/UL (ref 135–450)
PMV BLD AUTO: 9 FL (ref 5–10.5)
POTASSIUM REFLEX MAGNESIUM: 4.4 MMOL/L (ref 3.5–5.1)
RBC # BLD: 4.73 M/UL (ref 4.2–5.9)
SODIUM BLD-SCNC: 137 MMOL/L (ref 136–145)
WBC # BLD: 5.9 K/UL (ref 4–11)

## 2022-01-15 PROCEDURE — 85025 COMPLETE CBC W/AUTO DIFF WBC: CPT

## 2022-01-15 PROCEDURE — 6370000000 HC RX 637 (ALT 250 FOR IP): Performed by: EMERGENCY MEDICINE

## 2022-01-15 PROCEDURE — 80048 BASIC METABOLIC PNL TOTAL CA: CPT

## 2022-01-15 PROCEDURE — 99283 EMERGENCY DEPT VISIT LOW MDM: CPT

## 2022-01-15 RX ORDER — CEPHALEXIN 500 MG/1
500 CAPSULE ORAL 3 TIMES DAILY
Qty: 21 CAPSULE | Refills: 0 | Status: SHIPPED | OUTPATIENT
Start: 2022-01-15 | End: 2022-01-22

## 2022-01-15 RX ADMIN — APIXABAN 10 MG: 5 TABLET, FILM COATED ORAL at 10:57

## 2022-01-15 NOTE — ED PROVIDER NOTES
CHIEF COMPLAINT  Leg Swelling (patient reports left lower leg swelling x 5 days. dx with covid x 1 month ago)      HISTORY OF PRESENT ILLNESS  Cathleen Grace is a 64 y.o. male with a history of COVID, type 2 diabetes, hyperlipidemia presents emerged department for evaluation of left lower leg pain and swelling. He states that he was diagnosed with COVID about 1 month ago. He and his wife are concerned about potential DVT. Denies any chest pain, shortness of breath. Denies any fevers. He states that he has chronic swelling of the bilateral lower extremities, skin changes related to his diabetes. He states that he developed worsening swelling to the medial lateral aspect of the left leg and some overlying redness. Denies any pain in the posterior calf or pain going up into the thigh. Denies prior history of DVT or PE. He is not currently on anticoagulation.   Past Medical History:   Diagnosis Date    COVID     Diabetes ([address]     Hyperlipidemia      Past Surgical History:   Procedure Laterality Date    COLONOSCOPY  2018    Recheck 3 years    FINGER SURGERY      left index finger tip off - repaired    KNEE ARTHROSCOPY      right     NOSE SURGERY      tip of nose cut off - repaired     Family History   Problem Relation Age of Onset    Cancer Father         lymphoma,     Diabetes Father         gout    High Blood Pressure Mother      Social History     Socioeconomic History    Marital status:      Spouse name: Rachael Andrade Number of children: 3    Years of education: 15    Highest education level: Not on file   Occupational History    Not on file   Tobacco Use    Smoking status: Never Smoker    Smokeless tobacco: Never Used   Vaping Use    Vaping Use: Never used   Substance and Sexual Activity    Alcohol use: No    Drug use: Not Currently    Sexual activity: Yes     Partners: Female   Other Topics Concern    Not on file   Social History Narrative    Not on file     Social Determinants of Health     Financial Resource Strain: Low Risk     Difficulty of Paying Living Expenses: Not hard at all   Food Insecurity: No Food Insecurity    Worried About Running Out of Food in the Last Year: Never true    Jadyn of Food in the Last Year: Never true   Transportation Needs:     Lack of Transportation (Medical): Not on file    Lack of Transportation (Non-Medical):  Not on file   Physical Activity:     Days of Exercise per Week: Not on file    Minutes of Exercise per Session: Not on file   Stress:     Feeling of Stress : Not on file   Social Connections:     Frequency of Communication with Friends and Family: Not on file    Frequency of Social Gatherings with Friends and Family: Not on file    Attends Jew Services: Not on file    Active Member of [address] [place] [address] [employer] or Organizations: Not on file    Attends Club or Organization Meetings: Not on file    Marital Status: Not on file   Intimate Partner Violence:     Fear of Current or Ex-Partner: Not on file    Emotionally Abused: Not on file    Physically Abused: Not on file    Sexually Abused: Not on file   Housing Stability:     Unable to Pay for Housing in the Last Year: Not on file    Number of [place] in the Last Year: Not on file    Unstable Housing in the Last Year: Not on file     Current Facility-Administered Medications   Medication Dose Route Frequency Provider Last Rate Last Admin    apixaban (ELIQUIS) tablet 10 mg  10 mg Oral Once Brandee Chandler MD        And    apixaban (ELIQUIS) 5 mg tablets (ED 4 day apixaban DVT pack)  1 Package Oral RX Placeholder Brandee Chandler MD         Current Outpatient Medications   Medication Sig Dispense Refill    cephALEXin (KEFLEX) 500 MG capsule Take 1 capsule by mouth 3 times daily for 7 days 21 capsule 0    SITagliptin-metFORMIN HCl ER (JANUMET XR) 100-1000 MG TB24 TAKE 1 TABLET BY MOUTH ONE TIME A DAY 90 tablet 3    Ertugliflozin L-PyroglutamicAc (STEGLATRO) 15 MG TABS Take 1 tablet by mouth daily 90 tablet 3    blood glucose monitor kit and supplies Dispense sufficient amount for indicated testing frequency plus additional to accommodate PRN testing needs. Dispense all needed supplies to include: monitor, strips, lancing device, lancets, control solutions, alcohol swabs. 1 kit 0    Lancets MISC 1 each by Does not apply route daily 100 each 0    blood glucose monitor strips Test 1 times a day & as needed for symptoms of irregular blood glucose. Dispense sufficient amount for indicated testing frequency plus additional to accommodate PRN testing needs. 100 strip 0    levocetirizine (XYZAL) 5 MG tablet Take 5 mg by mouth nightly      Ascorbic Acid (VITAMIN C) 250 MG tablet Take 250 mg by mouth daily      Omega-3 Fatty Acids (FISH OIL) 1000 MG CAPS Take 1,000 mg by mouth daily       atorvastatin (LIPITOR) 10 MG tablet Take 1 tablet by mouth daily 90 tablet 3    Zinc Sulfate (ZINC 15 PO) Take by mouth      vitamin D3 (CHOLECALCIFEROL) 10 MCG (400 UNIT) TABS tablet Take 400 Units by mouth daily       No Known Allergies    REVIEW OF SYSTEMS  Positive and pertinent negatives as per HPI. All other systems were reviewed and are negative. PHYSICAL EXAM  BP (!) 157/94   Pulse 89   Temp 97.7 °F (36.5 °C)   Resp 16   SpO2 97%   GENERAL APPEARANCE: Awake and alert. Cooperative. HEAD: Normocephalic. Atraumatic. HEART: RRR. No harsh murmurs. Intact radial pulses 2+ bilaterally. LUNGS: Respirations unlabored without accessory muscle use. Speaking comfortably in full sentences. ABDOMEN: Soft. Non-distended. Non-tender. No guarding or rebound. EXTREMITIES: Mild bilateral lower extremity swelling. There is region of the left anterior medial leg with swelling, overlying erythema. No pain to palpation of the posterior calf. There is no crepitance. No fluctuance. SKIN: Warm and dry. No acute rashes. NEUROLOGICAL: Alert and oriented X 3.   No focal deficits    LABS  I have reviewed all labs for this visit. Results for orders placed or performed during the hospital encounter of 01/15/22   CBC Auto Differential   Result Value Ref Range    WBC 5.9 4.0 - 11.0 K/uL    RBC 4.73 4.20 - 5.90 M/uL    Hemoglobin 14.1 13.5 - 17.5 g/dL    Hematocrit 43.1 40.5 - 52.5 %    MCV 91.1 80.0 - 100.0 fL    MCH 29.8 26.0 - 34.0 pg    MCHC 32.8 31.0 - 36.0 g/dL    RDW 16.0 (H) 12.4 - 15.4 %    Platelets 782 (L) 910 - 450 K/uL    MPV 9.0 5.0 - 10.5 fL    Neutrophils % 58.4 %    Lymphocytes % 23.9 %    Monocytes % 14.5 %    Eosinophils % 2.6 %    Basophils % 0.6 %    Neutrophils Absolute 3.5 1.7 - 7.7 K/uL    Lymphocytes Absolute 1.4 1.0 - 5.1 K/uL    Monocytes Absolute 0.9 0.0 - 1.3 K/uL    Eosinophils Absolute 0.2 0.0 - 0.6 K/uL    Basophils Absolute 0.0 0.0 - 0.2 K/uL   Basic Metabolic Panel w/ Reflex to MG   Result Value Ref Range    Sodium 137 136 - 145 mmol/L    Potassium reflex Magnesium 4.4 3.5 - 5.1 mmol/L    Chloride 104 99 - 110 mmol/L    CO2 22 21 - 32 mmol/L    Anion Gap 11 3 - 16    Glucose 179 (H) 70 - 99 mg/dL    BUN 16 7 - 20 mg/dL    CREATININE 0.6 (L) 0.9 - 1.3 mg/dL    GFR Non-African American >60 >60    GFR African American >60 >60    Calcium 8.9 8.3 - 10.6 mg/dL       E    RADIOLOGY  X-RAYS:  I have reviewed radiologic plain film image(s). ALL OTHER NON-PLAIN FILM IMAGES SUCH AS CT, ULTRASOUND AND MRI HAVE BEEN READ BY THE RADIOLOGIST. VL Extremity Venous Left    (Results Pending)              ED COURSE/MDM  Patient seen and evaluated. Old records reviewed. Labs and imaging reviewed and results discussed with patient. 78-year-old male recent diagnosis of COVID presenting for evaluation of left lower leg swelling, redness. Patient arrives with stable vital signs except for mild hypertension. He has no respiratory distress. No chest pain at this time. On exam, he has localized swelling to the left medial anterior aspect of the left leg.   On bedside ultrasound, this looks to be superficial thrombophlebitis in the saphenous vein. The clot does not extend past the knee at this point in time. Common femoral vein, confluence of common femoral vein and saphenous vein, deep femoral vein and popliteal vein showed no evidence of DVT. I had an extensive discussion with the patient regarding anticoagulation for his presentation. Because of COVID, he is also at increased risk of clot propagation. We will obtain laboratory evaluation to ensure appropriate initiation of Eliquis. Patient does wish to proceed with anticoagulation and will plan for outpatient DVT study in the next several days. He was advised to follow-up with his primary care doctor to discuss next steps in terms of continuing anticoagulation or stopping anticoagulation after the DVT ultrasound is performed. He will be initiated on anticoagulation, antibiotics and advised on warm compresses for the area of superficial thrombophlebitis. Laboratory evaluation revealed hemoglobin of 14.1. There is mild thrombocytopenia to 131, this is chronic for the patient. He will be initiated on Eliquis. He is advised that he will need repeat blood work in several weeks to monitor for thrombocytopenia as this has a slight chance of worsening while on Eliquis. He was provided with outpatient referral for DVT imaging. He was advised on return precautions such as worsening leg pain, swelling, difficulty breathing, chest pain. Advised and educated on anticoagulation guidelines. The patient will be discharged from the emergency department. The patient was counseled on their diagnosis and any medications prescribed. They were advised on the need for PCP followup. They were counseled on the need to return to the emergency department if any of their symptoms were to worsen, change or have any other concerns. Discharged in stable condition. CLINICAL IMPRESSION  1.  Thrombophlebitis of superficial veins of left lower extremity        Blood pressure (!) 157/94, pulse 89, temperature 97.7 °F (36.5 °C), resp. rate 16, SpO2 97 %. DISPOSITION  Merwin Gottron was discharged to home in stable condition. This chart was generated in part by using Dragon Dictation system and may contain errors related to that system including errors in grammar, punctuation, and spelling, as well as words and phrases that may be inappropriate. If there are any questions or concerns please feel free to contact the dictating provider for clarification.      Mandie Araujo MD  01/15/22 [id number]

## 2022-01-18 ENCOUNTER — HOSPITAL ENCOUNTER (OUTPATIENT)
Dept: VASCULAR LAB | Age: 57
Discharge: HOME | End: 2022-01-18
Payer: COMMERCIAL

## 2022-01-18 DIAGNOSIS — I80.02 THROMBOPHLEBITIS OF SUPERFICIAL VEINS OF LEFT LOWER EXTREMITY: ICD-10-CM

## 2022-01-18 PROCEDURE — 93971 EXTREMITY STUDY: CPT

## 2022-01-28 ENCOUNTER — OFFICE VISIT (OUTPATIENT)
Dept: INTERNAL MEDICINE CLINIC | Age: 57
End: 2022-01-28
Payer: COMMERCIAL

## 2022-01-28 VITALS
WEIGHT: 296 LBS | HEIGHT: 76 IN | SYSTOLIC BLOOD PRESSURE: 134 MMHG | BODY MASS INDEX: 36.04 KG/M2 | DIASTOLIC BLOOD PRESSURE: 70 MMHG

## 2022-01-28 DIAGNOSIS — E11.65 TYPE 2 DIABETES MELLITUS WITH HYPERGLYCEMIA, WITHOUT LONG-TERM CURRENT USE OF INSULIN (HCC): Primary | ICD-10-CM

## 2022-01-28 DIAGNOSIS — I82.812 ACUTE SUPERFICIAL VENOUS THROMBOSIS OF LEFT LOWER EXTREMITY: ICD-10-CM

## 2022-01-28 PROCEDURE — 99214 OFFICE O/P EST MOD 30 MIN: CPT | Performed by: INTERNAL MEDICINE

## 2022-01-28 PROCEDURE — G8427 DOCREV CUR MEDS BY ELIG CLIN: HCPCS | Performed by: INTERNAL MEDICINE

## 2022-01-28 PROCEDURE — G8484 FLU IMMUNIZE NO ADMIN: HCPCS | Performed by: INTERNAL MEDICINE

## 2022-01-28 PROCEDURE — 1036F TOBACCO NON-USER: CPT | Performed by: INTERNAL MEDICINE

## 2022-01-28 PROCEDURE — 2022F DILAT RTA XM EVC RTNOPTHY: CPT | Performed by: INTERNAL MEDICINE

## 2022-01-28 PROCEDURE — G8417 CALC BMI ABV UP PARAM F/U: HCPCS | Performed by: INTERNAL MEDICINE

## 2022-01-28 PROCEDURE — 3046F HEMOGLOBIN A1C LEVEL >9.0%: CPT | Performed by: INTERNAL MEDICINE

## 2022-01-28 PROCEDURE — 3017F COLORECTAL CA SCREEN DOC REV: CPT | Performed by: INTERNAL MEDICINE

## 2022-01-28 NOTE — PROGRESS NOTES
Roe Negron (:  1965) is a 64 y.o. male,Established patient, here for evaluation of the following chief complaint(s):  Establish Care         ASSESSMENT/PLAN:  1. Type 2 diabetes mellitus with hyperglycemia, without long-term current use of insulin (HCC)  - somewhat controlled, continue sitabliptin-metformin 100-1000mg daily, steglatro 15mg daily  - recheck in 3 months  -     Comprehensive Metabolic Panel; Future  -     Hemoglobin A1C; Future  -     CBC Auto Differential; Future  -     Lipid Panel; Future  2. Acute superficial venous thrombosis of left lower extremity   - eliquis 5mg twice daily x3 months    Return in about 3 months (around 2022) for DM follow up. Subjective   SUBJECTIVE/OBJECTIVE:  HPI    Diagnosed with superficial thrombophlebitis this month, was told to take eliquis. Symptoms started with pain and swelling in the left leg. Redness has subsided. Type 2 diabetes-  Taking steglatro and janumet, sugars have been somewhat controlled, no side effects    Review of Systems       Objective   Physical Exam  Vitals reviewed. Constitutional:       General: He is not in acute distress. Appearance: Normal appearance. He is well-developed. HENT:      Head: Normocephalic and atraumatic. Cardiovascular:      Rate and Rhythm: Normal rate and regular rhythm. Heart sounds: Normal heart sounds. Pulmonary:      Effort: Pulmonary effort is normal. No respiratory distress. Breath sounds: Normal breath sounds. Skin:     General: Skin is warm and dry. Neurological:      Mental Status: He is alert. Psychiatric:         Mood and Affect: Mood normal.         Behavior: Behavior normal.         Thought Content: Thought content normal.         Judgment: Judgment normal.                  An electronic signature was used to authenticate this note.     --Renetta Peralta MD

## 2022-02-28 NOTE — TELEPHONE ENCOUNTER
Patients wife called,  needs a refill on:    SITagliptin-metFORMIN HCl ER (JANUMET XR) 100-1000 MG TB24    Send to:  [facility] # [phone]    Would she please send for 6 month supply.         Please call and advise

## 2022-03-01 RX ORDER — SITAGLIPTIN AND METFORMIN HYDROCHLORIDE 100; 1000 MG/1; MG/1
TABLET, FILM COATED, EXTENDED RELEASE ORAL
Qty: 90 TABLET | Refills: 3 | Status: SHIPPED | OUTPATIENT
Start: 2022-03-01 | End: 2022-03-06 | Stop reason: SDUPTHER

## 2022-03-07 RX ORDER — SITAGLIPTIN AND METFORMIN HYDROCHLORIDE 100; 1000 MG/1; MG/1
TABLET, FILM COATED, EXTENDED RELEASE ORAL
Qty: 90 TABLET | Refills: 3 | Status: SHIPPED | OUTPATIENT
Start: 2022-03-07 | End: 2022-11-04

## 2022-04-23 DIAGNOSIS — E11.65 TYPE 2 DIABETES MELLITUS WITH HYPERGLYCEMIA, WITHOUT LONG-TERM CURRENT USE OF INSULIN (HCC): ICD-10-CM

## 2022-04-23 LAB
A/G RATIO: 2 (ref 1.1–2.2)
ALBUMIN SERPL-MCNC: 4.5 G/DL (ref 3.4–5)
ALP BLD-CCNC: 77 U/L (ref 40–129)
ALT SERPL-CCNC: 31 U/L (ref 10–40)
ANION GAP SERPL CALCULATED.3IONS-SCNC: 13 MMOL/L (ref 3–16)
AST SERPL-CCNC: 27 U/L (ref 15–37)
BASOPHILS ABSOLUTE: 0 K/UL (ref 0–0.2)
BASOPHILS RELATIVE PERCENT: 0.5 %
BILIRUB SERPL-MCNC: 0.3 MG/DL (ref 0–1)
BUN BLDV-MCNC: 23 MG/DL (ref 7–20)
CALCIUM SERPL-MCNC: 9.1 MG/DL (ref 8.3–10.6)
CHLORIDE BLD-SCNC: 104 MMOL/L (ref 99–110)
CHOLESTEROL, TOTAL: 124 MG/DL (ref 0–199)
CO2: 24 MMOL/L (ref 21–32)
CREAT SERPL-MCNC: 0.8 MG/DL (ref 0.9–1.3)
EOSINOPHILS ABSOLUTE: 0.1 K/UL (ref 0–0.6)
EOSINOPHILS RELATIVE PERCENT: 0.9 %
GFR AFRICAN AMERICAN: >60
GFR NON-AFRICAN AMERICAN: >60
GLUCOSE BLD-MCNC: 168 MG/DL (ref 70–99)
HCT VFR BLD CALC: 46.4 % (ref 40.5–52.5)
HDLC SERPL-MCNC: 40 MG/DL (ref 40–60)
HEMOGLOBIN: 15.3 G/DL (ref 13.5–17.5)
LDL CHOLESTEROL CALCULATED: 70 MG/DL
LYMPHOCYTES ABSOLUTE: 2.7 K/UL (ref 1–5.1)
LYMPHOCYTES RELATIVE PERCENT: 36.7 %
MCH RBC QN AUTO: 30 PG (ref 26–34)
MCHC RBC AUTO-ENTMCNC: 33.1 G/DL (ref 31–36)
MCV RBC AUTO: 90.6 FL (ref 80–100)
MONOCYTES ABSOLUTE: 0.8 K/UL (ref 0–1.3)
MONOCYTES RELATIVE PERCENT: 10.6 %
NEUTROPHILS ABSOLUTE: 3.8 K/UL (ref 1.7–7.7)
NEUTROPHILS RELATIVE PERCENT: 51.3 %
PDW BLD-RTO: 14.4 % (ref 12.4–15.4)
PLATELET # BLD: 124 K/UL (ref 135–450)
PMV BLD AUTO: 9.6 FL (ref 5–10.5)
POTASSIUM SERPL-SCNC: 5.1 MMOL/L (ref 3.5–5.1)
RBC # BLD: 5.12 M/UL (ref 4.2–5.9)
SODIUM BLD-SCNC: 141 MMOL/L (ref 136–145)
TOTAL PROTEIN: 6.7 G/DL (ref 6.4–8.2)
TRIGL SERPL-MCNC: 70 MG/DL (ref 0–150)
VLDLC SERPL CALC-MCNC: 14 MG/DL
WBC # BLD: 7.5 K/UL (ref 4–11)

## 2022-04-24 LAB
ESTIMATED AVERAGE GLUCOSE: 159.9 MG/DL
HBA1C MFR BLD: 7.2 %

## 2022-04-29 ENCOUNTER — OFFICE VISIT (OUTPATIENT)
Dept: INTERNAL MEDICINE CLINIC | Age: 57
End: 2022-04-29
Payer: COMMERCIAL

## 2022-04-29 VITALS
OXYGEN SATURATION: 98 % | BODY MASS INDEX: 37.26 KG/M2 | HEART RATE: 80 BPM | SYSTOLIC BLOOD PRESSURE: 150 MMHG | DIASTOLIC BLOOD PRESSURE: 90 MMHG | HEIGHT: 76 IN | WEIGHT: 306 LBS | TEMPERATURE: 97.8 F

## 2022-04-29 DIAGNOSIS — R03.0 ELEVATED BLOOD PRESSURE READING WITHOUT DIAGNOSIS OF HYPERTENSION: ICD-10-CM

## 2022-04-29 DIAGNOSIS — E11.65 TYPE 2 DIABETES MELLITUS WITH HYPERGLYCEMIA, WITHOUT LONG-TERM CURRENT USE OF INSULIN (HCC): Primary | ICD-10-CM

## 2022-04-29 PROCEDURE — 1036F TOBACCO NON-USER: CPT | Performed by: INTERNAL MEDICINE

## 2022-04-29 PROCEDURE — 99214 OFFICE O/P EST MOD 30 MIN: CPT | Performed by: INTERNAL MEDICINE

## 2022-04-29 PROCEDURE — 2022F DILAT RTA XM EVC RTNOPTHY: CPT | Performed by: INTERNAL MEDICINE

## 2022-04-29 PROCEDURE — G8417 CALC BMI ABV UP PARAM F/U: HCPCS | Performed by: INTERNAL MEDICINE

## 2022-04-29 PROCEDURE — G8427 DOCREV CUR MEDS BY ELIG CLIN: HCPCS | Performed by: INTERNAL MEDICINE

## 2022-04-29 PROCEDURE — 3017F COLORECTAL CA SCREEN DOC REV: CPT | Performed by: INTERNAL MEDICINE

## 2022-04-29 PROCEDURE — 3051F HG A1C>EQUAL 7.0%<8.0%: CPT | Performed by: INTERNAL MEDICINE

## 2022-04-29 RX ORDER — KETOCONAZOLE 20 MG/ML
SHAMPOO TOPICAL
Qty: 1 EACH | Refills: 3 | Status: SHIPPED | OUTPATIENT
Start: 2022-04-29

## 2022-04-29 ASSESSMENT — PATIENT HEALTH QUESTIONNAIRE - PHQ9
SUM OF ALL RESPONSES TO PHQ QUESTIONS 1-9: 0
SUM OF ALL RESPONSES TO PHQ9 QUESTIONS 1 & 2: 0
1. LITTLE INTEREST OR PLEASURE IN DOING THINGS: 0
2. FEELING DOWN, DEPRESSED OR HOPELESS: 0
SUM OF ALL RESPONSES TO PHQ QUESTIONS 1-9: 0

## 2022-04-29 NOTE — PROGRESS NOTES
Angie Hernandez (:  1965) is a 62 y.o. male,Established patient, here for evaluation of the following chief complaint(s):  Diabetes (3 month f/u)         ASSESSMENT/PLAN:  1. Type 2 diabetes mellitus with hyperglycemia, without long-term current use of insulin (HCC)  -Very near goal, continue Steglatro 7.5 mg daily, Janumet 100-1000 mg daily  -     Comprehensive Metabolic Panel; Future  -     Hemoglobin A1C; Future  2. Elevated blood pressure reading without diagnosis of hypertension   -Blood pressure remained high on right check with like him to monitor at home, if it is remaining elevated then we will need to add antihypertensive    Return in about 3 months (around 2022) for DM follow up. Subjective   SUBJECTIVE/OBJECTIVE:  HPI    Type 2 diabetes -taking the Steglatro and Janumet, no significant side effects. No numbness or tingling. He does not check the blood pressure at home regularly. No chest pain or shortness of breath. Review of Systems       Objective   Physical Exam  Vitals reviewed. Constitutional:       General: He is not in acute distress. Appearance: Normal appearance. He is well-developed. He is obese. HENT:      Head: Normocephalic and atraumatic. Cardiovascular:      Rate and Rhythm: Normal rate and regular rhythm. Heart sounds: Normal heart sounds. Pulmonary:      Effort: Pulmonary effort is normal. No respiratory distress. Breath sounds: Normal breath sounds. Skin:     General: Skin is warm and dry. Neurological:      Mental Status: He is alert. Psychiatric:         Mood and Affect: Mood normal.         Behavior: Behavior normal.         Thought Content: Thought content normal.         Judgment: Judgment normal.                  An electronic signature was used to authenticate this note.     --Kari Bell MD

## 2022-07-29 ENCOUNTER — OFFICE VISIT (OUTPATIENT)
Dept: INTERNAL MEDICINE CLINIC | Age: 57
End: 2022-07-29
Payer: COMMERCIAL

## 2022-07-29 VITALS
HEIGHT: 76 IN | BODY MASS INDEX: 37.14 KG/M2 | SYSTOLIC BLOOD PRESSURE: 144 MMHG | WEIGHT: 305 LBS | DIASTOLIC BLOOD PRESSURE: 74 MMHG

## 2022-07-29 DIAGNOSIS — E11.65 TYPE 2 DIABETES MELLITUS WITH HYPERGLYCEMIA, WITHOUT LONG-TERM CURRENT USE OF INSULIN (HCC): Primary | ICD-10-CM

## 2022-07-29 DIAGNOSIS — N52.9 ERECTILE DYSFUNCTION, UNSPECIFIED ERECTILE DYSFUNCTION TYPE: ICD-10-CM

## 2022-07-29 DIAGNOSIS — I10 PRIMARY HYPERTENSION: ICD-10-CM

## 2022-07-29 DIAGNOSIS — R20.8 BURNING SENSATION OF FOOT: ICD-10-CM

## 2022-07-29 PROBLEM — R74.01 ELEVATED TRANSAMINASE LEVEL: Status: RESOLVED | Noted: 2021-10-29 | Resolved: 2022-07-29

## 2022-07-29 PROBLEM — J12.82 PNEUMONIA DUE TO COVID-19 VIRUS: Status: RESOLVED | Noted: 2021-12-14 | Resolved: 2022-07-29

## 2022-07-29 PROBLEM — J96.00 ACUTE RESPIRATORY FAILURE DUE TO COVID-19 (HCC): Status: RESOLVED | Noted: 2021-12-15 | Resolved: 2022-07-29

## 2022-07-29 PROBLEM — E78.5 HYPERLIPIDEMIA: Status: RESOLVED | Noted: 2021-12-15 | Resolved: 2022-07-29

## 2022-07-29 PROBLEM — U07.1 PNEUMONIA DUE TO COVID-19 VIRUS: Status: RESOLVED | Noted: 2021-12-14 | Resolved: 2022-07-29

## 2022-07-29 PROBLEM — U07.1 ACUTE RESPIRATORY FAILURE DUE TO COVID-19 (HCC): Status: RESOLVED | Noted: 2021-12-15 | Resolved: 2022-07-29

## 2022-07-29 PROCEDURE — 99214 OFFICE O/P EST MOD 30 MIN: CPT | Performed by: INTERNAL MEDICINE

## 2022-07-29 PROCEDURE — 3051F HG A1C>EQUAL 7.0%<8.0%: CPT | Performed by: INTERNAL MEDICINE

## 2022-07-29 RX ORDER — TADALAFIL 10 MG/1
10 TABLET ORAL DAILY PRN
Qty: 10 TABLET | Refills: 2 | Status: SHIPPED | OUTPATIENT
Start: 2022-07-29

## 2022-07-29 RX ORDER — LOSARTAN POTASSIUM AND HYDROCHLOROTHIAZIDE 12.5; 5 MG/1; MG/1
1 TABLET ORAL DAILY
Qty: 90 TABLET | Refills: 1 | Status: SHIPPED | OUTPATIENT
Start: 2022-07-29

## 2022-07-29 ASSESSMENT — PATIENT HEALTH QUESTIONNAIRE - PHQ9
1. LITTLE INTEREST OR PLEASURE IN DOING THINGS: 0
2. FEELING DOWN, DEPRESSED OR HOPELESS: 0
SUM OF ALL RESPONSES TO PHQ QUESTIONS 1-9: 0
SUM OF ALL RESPONSES TO PHQ9 QUESTIONS 1 & 2: 0
SUM OF ALL RESPONSES TO PHQ QUESTIONS 1-9: 0

## 2022-07-29 NOTE — PROGRESS NOTES
Amandeep Toro (:  1965) is a 62 y.o. male,Established patient, here for evaluation of the following chief complaint(s):  Diabetes         ASSESSMENT/PLAN:  1. Type 2 diabetes mellitus with hyperglycemia, without long-term current use of insulin (HCC)  -Continue SGLT2, Janumet. Reassess in 3 to 4 months, suspect the dietary changes may be enough to get him back under 7  -     Comprehensive Metabolic Panel; Future  -     Lipid Panel; Future  -     CBC with Auto Differential; Future  -     Hemoglobin A1C; Future  -     Vitamin B12; Future  2. Burning sensation of foot   -Symptoms consistent with neuropathy, likely post COVID and given the timing. This may still improve since it has only been a little over 6 months since his infection. Could try adding a supplement with ALA  3. Erectile dysfunction, unspecified erectile dysfunction type   -Start tadalafil 10 mg daily as needed, counseled on side effects  4. Primary hypertension   -Change HCTZ to losartan-HCTZ 50-12.5 mg daily    Return in about 3 months (around 10/29/2022) for DM follow up. Subjective   SUBJECTIVE/OBJECTIVE:  HPI    He is taking the SGLT2 and the Janumet for diabetes, working on reducing the sugar in his diet. He cut agave out on his diet about 3 weeks ago. No side effects from medications. He has burning in his feet but this is only been there since COVID infection. He has also been experiencing erectile dysfunction, this also predated the COVID infection. He has no history of heart disease. No chest pain or shortness of breath. He does not check his blood pressure at home, does take the hydrochlorothiazide as prescribed. Review of Systems       Objective   Physical Exam  Vitals reviewed. Constitutional:       General: He is not in acute distress. Appearance: Normal appearance. He is well-developed. He is obese. HENT:      Head: Normocephalic and atraumatic.    Cardiovascular:      Rate and Rhythm: Normal rate and regular rhythm. Heart sounds: Normal heart sounds. Pulmonary:      Effort: Pulmonary effort is normal. No respiratory distress. Breath sounds: Normal breath sounds. Skin:     General: Skin is warm and dry. Neurological:      Mental Status: He is alert. Psychiatric:         Mood and Affect: Mood normal.         Behavior: Behavior normal.         Thought Content: Thought content normal.         Judgment: Judgment normal.                An electronic signature was used to authenticate this note.     --Azalea Higuera MD

## 2022-10-29 DIAGNOSIS — E11.65 TYPE 2 DIABETES MELLITUS WITH HYPERGLYCEMIA, WITHOUT LONG-TERM CURRENT USE OF INSULIN (HCC): ICD-10-CM

## 2022-10-29 LAB
A/G RATIO: 1.9 (ref 1.1–2.2)
ALBUMIN SERPL-MCNC: 4.6 G/DL (ref 3.4–5)
ALP BLD-CCNC: 85 U/L (ref 40–129)
ALT SERPL-CCNC: 29 U/L (ref 10–40)
ANION GAP SERPL CALCULATED.3IONS-SCNC: 13 MMOL/L (ref 3–16)
AST SERPL-CCNC: 22 U/L (ref 15–37)
BASOPHILS ABSOLUTE: 0 K/UL (ref 0–0.2)
BASOPHILS RELATIVE PERCENT: 0.5 %
BILIRUB SERPL-MCNC: 0.4 MG/DL (ref 0–1)
BUN BLDV-MCNC: 16 MG/DL (ref 7–20)
CALCIUM SERPL-MCNC: 9.4 MG/DL (ref 8.3–10.6)
CHLORIDE BLD-SCNC: 104 MMOL/L (ref 99–110)
CHOLESTEROL, TOTAL: 153 MG/DL (ref 0–199)
CO2: 23 MMOL/L (ref 21–32)
CREAT SERPL-MCNC: 0.7 MG/DL (ref 0.9–1.3)
EOSINOPHILS ABSOLUTE: 0.1 K/UL (ref 0–0.6)
EOSINOPHILS RELATIVE PERCENT: 1.7 %
GFR SERPL CREATININE-BSD FRML MDRD: >60 ML/MIN/{1.73_M2}
GLUCOSE BLD-MCNC: 196 MG/DL (ref 70–99)
HCT VFR BLD CALC: 44.4 % (ref 40.5–52.5)
HDLC SERPL-MCNC: 40 MG/DL (ref 40–60)
HEMOGLOBIN: 15.1 G/DL (ref 13.5–17.5)
LDL CHOLESTEROL CALCULATED: 91 MG/DL
LYMPHOCYTES ABSOLUTE: 2.5 K/UL (ref 1–5.1)
LYMPHOCYTES RELATIVE PERCENT: 36.9 %
MCH RBC QN AUTO: 30.7 PG (ref 26–34)
MCHC RBC AUTO-ENTMCNC: 34 G/DL (ref 31–36)
MCV RBC AUTO: 90.2 FL (ref 80–100)
MONOCYTES ABSOLUTE: 0.7 K/UL (ref 0–1.3)
MONOCYTES RELATIVE PERCENT: 9.9 %
NEUTROPHILS ABSOLUTE: 3.4 K/UL (ref 1.7–7.7)
NEUTROPHILS RELATIVE PERCENT: 51 %
PDW BLD-RTO: 13.5 % (ref 12.4–15.4)
PLATELET # BLD: 129 K/UL (ref 135–450)
PMV BLD AUTO: 9.7 FL (ref 5–10.5)
POTASSIUM SERPL-SCNC: 5.1 MMOL/L (ref 3.5–5.1)
RBC # BLD: 4.92 M/UL (ref 4.2–5.9)
SODIUM BLD-SCNC: 140 MMOL/L (ref 136–145)
TOTAL PROTEIN: 7 G/DL (ref 6.4–8.2)
TRIGL SERPL-MCNC: 108 MG/DL (ref 0–150)
VITAMIN B-12: 782 PG/ML (ref 211–911)
VLDLC SERPL CALC-MCNC: 22 MG/DL
WBC # BLD: 6.7 K/UL (ref 4–11)

## 2022-10-30 LAB
ESTIMATED AVERAGE GLUCOSE: 177.2 MG/DL
HBA1C MFR BLD: 7.8 %

## 2022-11-04 ENCOUNTER — OFFICE VISIT (OUTPATIENT)
Dept: INTERNAL MEDICINE CLINIC | Age: 57
End: 2022-11-04
Payer: COMMERCIAL

## 2022-11-04 VITALS
WEIGHT: 314 LBS | BODY MASS INDEX: 38.24 KG/M2 | HEIGHT: 76 IN | DIASTOLIC BLOOD PRESSURE: 72 MMHG | SYSTOLIC BLOOD PRESSURE: 138 MMHG

## 2022-11-04 DIAGNOSIS — I10 PRIMARY HYPERTENSION: ICD-10-CM

## 2022-11-04 DIAGNOSIS — R53.83 FATIGUE, UNSPECIFIED TYPE: ICD-10-CM

## 2022-11-04 DIAGNOSIS — E11.65 TYPE 2 DIABETES MELLITUS WITH HYPERGLYCEMIA, WITHOUT LONG-TERM CURRENT USE OF INSULIN (HCC): Primary | ICD-10-CM

## 2022-11-04 PROCEDURE — 3051F HG A1C>EQUAL 7.0%<8.0%: CPT | Performed by: INTERNAL MEDICINE

## 2022-11-04 PROCEDURE — G8484 FLU IMMUNIZE NO ADMIN: HCPCS | Performed by: INTERNAL MEDICINE

## 2022-11-04 PROCEDURE — 3017F COLORECTAL CA SCREEN DOC REV: CPT | Performed by: INTERNAL MEDICINE

## 2022-11-04 PROCEDURE — 3074F SYST BP LT 130 MM HG: CPT | Performed by: INTERNAL MEDICINE

## 2022-11-04 PROCEDURE — 3078F DIAST BP <80 MM HG: CPT | Performed by: INTERNAL MEDICINE

## 2022-11-04 PROCEDURE — G8427 DOCREV CUR MEDS BY ELIG CLIN: HCPCS | Performed by: INTERNAL MEDICINE

## 2022-11-04 PROCEDURE — 2022F DILAT RTA XM EVC RTNOPTHY: CPT | Performed by: INTERNAL MEDICINE

## 2022-11-04 PROCEDURE — 1036F TOBACCO NON-USER: CPT | Performed by: INTERNAL MEDICINE

## 2022-11-04 PROCEDURE — G8417 CALC BMI ABV UP PARAM F/U: HCPCS | Performed by: INTERNAL MEDICINE

## 2022-11-04 PROCEDURE — 99214 OFFICE O/P EST MOD 30 MIN: CPT | Performed by: INTERNAL MEDICINE

## 2022-11-04 RX ORDER — ATORVASTATIN CALCIUM 10 MG/1
10 TABLET, FILM COATED ORAL DAILY
Qty: 90 TABLET | Refills: 3 | Status: SHIPPED | OUTPATIENT
Start: 2022-11-04

## 2022-11-04 RX ORDER — SEMAGLUTIDE 1.34 MG/ML
INJECTION, SOLUTION SUBCUTANEOUS
Qty: 1 ADJUSTABLE DOSE PRE-FILLED PEN SYRINGE | Refills: 2 | Status: SHIPPED | OUTPATIENT
Start: 2022-11-04

## 2022-11-04 RX ORDER — METFORMIN HYDROCHLORIDE 500 MG/1
1000 TABLET, EXTENDED RELEASE ORAL
Qty: 180 TABLET | Refills: 1 | Status: SHIPPED | OUTPATIENT
Start: 2022-11-04

## 2022-11-04 SDOH — ECONOMIC STABILITY: FOOD INSECURITY: WITHIN THE PAST 12 MONTHS, YOU WORRIED THAT YOUR FOOD WOULD RUN OUT BEFORE YOU GOT MONEY TO BUY MORE.: NEVER TRUE

## 2022-11-04 SDOH — ECONOMIC STABILITY: FOOD INSECURITY: WITHIN THE PAST 12 MONTHS, THE FOOD YOU BOUGHT JUST DIDN'T LAST AND YOU DIDN'T HAVE MONEY TO GET MORE.: NEVER TRUE

## 2022-11-04 ASSESSMENT — PATIENT HEALTH QUESTIONNAIRE - PHQ9
SUM OF ALL RESPONSES TO PHQ QUESTIONS 1-9: 0
1. LITTLE INTEREST OR PLEASURE IN DOING THINGS: 0
SUM OF ALL RESPONSES TO PHQ9 QUESTIONS 1 & 2: 0
SUM OF ALL RESPONSES TO PHQ QUESTIONS 1-9: 0
2. FEELING DOWN, DEPRESSED OR HOPELESS: 0

## 2022-11-04 ASSESSMENT — SOCIAL DETERMINANTS OF HEALTH (SDOH): HOW HARD IS IT FOR YOU TO PAY FOR THE VERY BASICS LIKE FOOD, HOUSING, MEDICAL CARE, AND HEATING?: NOT HARD AT ALL

## 2022-11-04 NOTE — PROGRESS NOTES
Antonia Flores (:  1965) is a 62 y.o. male,Established patient, here for evaluation of the following chief complaint(s):  Diabetes         ASSESSMENT/PLAN:  1. Type 2 diabetes mellitus with hyperglycemia, without long-term current use of insulin (HCC)  -A1c is up a bit. We discussed a GLP-1, this may be the best bet for him. Would discontinue the Januvia portion, continue metformin, he can use what he has at the [facility] but if the Terese Bhumika works well enough we likely can stop that as well since he is not taking it for any other secondary benefit. -     Comprehensive Metabolic Panel; Future  -     Hemoglobin A1C; Future  2. Primary hypertension   -Improved, continue losartan-HCTZ 50-12.5 mg daily  3. Fatigue, unspecified type  -Can check in a.m. testosterone, though may be accommodation of stress and normal aging  -     Testosterone, free, total; Future    Return in about 3 months (around 2023) for DM follow up. Subjective   SUBJECTIVE/OBJECTIVE:  HPI    Type 2 diabetes -he was disappointed that the A1c was up, his weight is also up a little bit. He has had a lot of stress related to some family issues, trying to keep that under control. Hypertension -he is taking the blood pressure medication regularly. No chest pain or shortness of breath. His wife thinks that he has been more fatigued has lost some muscle mass, she is wondering if his testosterone may be low    Review of Systems       Objective   Physical Exam  Vitals reviewed. Constitutional:       General: He is not in acute distress. Appearance: Normal appearance. He is well-developed. He is obese. HENT:      Head: Normocephalic and atraumatic. Cardiovascular:      Rate and Rhythm: Normal rate and regular rhythm. Heart sounds: Normal heart sounds. Pulmonary:      Effort: Pulmonary effort is normal. No respiratory distress. Breath sounds: Normal breath sounds.    Skin:     General: Skin is warm and dry.   Neurological:      Mental Status: He is alert. Psychiatric:         Mood and Affect: Mood normal.         Behavior: Behavior normal.         Thought Content: Thought content normal.         Judgment: Judgment normal.                An electronic signature was used to authenticate this note.     --Sabiha Batres MD

## 2023-01-12 RX ORDER — SEMAGLUTIDE 1.34 MG/ML
INJECTION, SOLUTION SUBCUTANEOUS
Qty: 12 ML | Refills: 3 | Status: SHIPPED | OUTPATIENT
Start: 2023-01-12

## 2023-01-12 NOTE — TELEPHONE ENCOUNTER
Pharmacy states they do not have the .5 dose of ozempic it is on back order. They do have the 1mg dose in a 4mg per 3ml injection. Insurance will cover it in the 1mg dose. Please advise.

## 2023-02-03 RX ORDER — LOSARTAN POTASSIUM AND HYDROCHLOROTHIAZIDE 12.5; 5 MG/1; MG/1
1 TABLET ORAL DAILY
Qty: 90 TABLET | Refills: 1 | Status: SHIPPED | OUTPATIENT
Start: 2023-02-03

## 2023-02-14 ENCOUNTER — TELEPHONE (OUTPATIENT)
Dept: INTERNAL MEDICINE CLINIC | Age: 58
End: 2023-02-14

## 2023-02-24 ENCOUNTER — OFFICE VISIT (OUTPATIENT)
Dept: INTERNAL MEDICINE CLINIC | Age: 58
End: 2023-02-24
Payer: COMMERCIAL

## 2023-02-24 VITALS
HEIGHT: 76 IN | BODY MASS INDEX: 38.36 KG/M2 | WEIGHT: 315 LBS | DIASTOLIC BLOOD PRESSURE: 78 MMHG | SYSTOLIC BLOOD PRESSURE: 142 MMHG

## 2023-02-24 DIAGNOSIS — E78.2 HYPERLIPIDEMIA, MIXED: ICD-10-CM

## 2023-02-24 DIAGNOSIS — R79.89 LOW TESTOSTERONE IN MALE: ICD-10-CM

## 2023-02-24 DIAGNOSIS — E11.65 TYPE 2 DIABETES MELLITUS WITH HYPERGLYCEMIA, WITHOUT LONG-TERM CURRENT USE OF INSULIN (HCC): Primary | ICD-10-CM

## 2023-02-24 DIAGNOSIS — Z12.5 SCREENING FOR MALIGNANT NEOPLASM OF PROSTATE: ICD-10-CM

## 2023-02-24 DIAGNOSIS — I10 PRIMARY HYPERTENSION: ICD-10-CM

## 2023-02-24 PROCEDURE — 3017F COLORECTAL CA SCREEN DOC REV: CPT | Performed by: INTERNAL MEDICINE

## 2023-02-24 PROCEDURE — 3046F HEMOGLOBIN A1C LEVEL >9.0%: CPT | Performed by: INTERNAL MEDICINE

## 2023-02-24 PROCEDURE — 2022F DILAT RTA XM EVC RTNOPTHY: CPT | Performed by: INTERNAL MEDICINE

## 2023-02-24 PROCEDURE — G8417 CALC BMI ABV UP PARAM F/U: HCPCS | Performed by: INTERNAL MEDICINE

## 2023-02-24 PROCEDURE — G8484 FLU IMMUNIZE NO ADMIN: HCPCS | Performed by: INTERNAL MEDICINE

## 2023-02-24 PROCEDURE — 1036F TOBACCO NON-USER: CPT | Performed by: INTERNAL MEDICINE

## 2023-02-24 PROCEDURE — G8427 DOCREV CUR MEDS BY ELIG CLIN: HCPCS | Performed by: INTERNAL MEDICINE

## 2023-02-24 PROCEDURE — 3078F DIAST BP <80 MM HG: CPT | Performed by: INTERNAL MEDICINE

## 2023-02-24 PROCEDURE — 3074F SYST BP LT 130 MM HG: CPT | Performed by: INTERNAL MEDICINE

## 2023-02-24 PROCEDURE — 99214 OFFICE O/P EST MOD 30 MIN: CPT | Performed by: INTERNAL MEDICINE

## 2023-02-24 SDOH — ECONOMIC STABILITY: FOOD INSECURITY: WITHIN THE PAST 12 MONTHS, YOU WORRIED THAT YOUR FOOD WOULD RUN OUT BEFORE YOU GOT MONEY TO BUY MORE.: NEVER TRUE

## 2023-02-24 SDOH — ECONOMIC STABILITY: FOOD INSECURITY: WITHIN THE PAST 12 MONTHS, THE FOOD YOU BOUGHT JUST DIDN'T LAST AND YOU DIDN'T HAVE MONEY TO GET MORE.: NEVER TRUE

## 2023-02-24 SDOH — ECONOMIC STABILITY: INCOME INSECURITY: HOW HARD IS IT FOR YOU TO PAY FOR THE VERY BASICS LIKE FOOD, HOUSING, MEDICAL CARE, AND HEATING?: NOT HARD AT ALL

## 2023-02-24 SDOH — ECONOMIC STABILITY: HOUSING INSECURITY
IN THE LAST 12 MONTHS, WAS THERE A TIME WHEN YOU DID NOT HAVE A STEADY PLACE TO SLEEP OR SLEPT IN A SHELTER (INCLUDING NOW)?: NO

## 2023-02-24 ASSESSMENT — PATIENT HEALTH QUESTIONNAIRE - PHQ9
SUM OF ALL RESPONSES TO PHQ QUESTIONS 1-9: 0
2. FEELING DOWN, DEPRESSED OR HOPELESS: 0
SUM OF ALL RESPONSES TO PHQ QUESTIONS 1-9: 0
1. LITTLE INTEREST OR PLEASURE IN DOING THINGS: 0
SUM OF ALL RESPONSES TO PHQ QUESTIONS 1-9: 0
SUM OF ALL RESPONSES TO PHQ QUESTIONS 1-9: 0
SUM OF ALL RESPONSES TO PHQ9 QUESTIONS 1 & 2: 0

## 2023-02-24 NOTE — PROGRESS NOTES
Ernestene Bamberger (:  1965) is a 62 y.o. male,Established patient, here for evaluation of the following chief complaint(s):  Diabetes and Results         ASSESSMENT/PLAN:  1. Type 2 diabetes mellitus with hyperglycemia, without long-term current use of insulin (HCC)  - elevated on last check, but just started getting the Ozempic. For right now will continue with Ozempic 0.5mg weekly rather than the higher dose. Reassess again in 3 months  - continue metformin XR 1000 mg daily  -     Comprehensive Metabolic Panel; Future  -     Hemoglobin A1C; Future  -     Lipid Panel; Future  -     CBC with Auto Differential; Future  2. Primary hypertension   - elevated today. Will work on making some changes in terms of weight loss, for now continue losartan-HCTZ 50-12.5mg daily, reassess at next visit  3. Hyperlipidemia, mixed   - controlled, continue atorvastatin 10mg daily  4. Low testosterone in male  - he does have a significantly low testosterone on blood work. Discussed potentially pursuing testosterone replacement, he is not sure whether he would want to pursue this. Will recheck again at next visit to verify level. -     Testosterone, free, total; Future  5. Screening for malignant neoplasm of prostate  -     PSA Screening; Future    Return in about 3 months (around 2023) for DM follow up. Subjective   SUBJECTIVE/OBJECTIVE:  HPI    Type 2 diabetes - they realized they were injecting the Ozempic incorrectly, so for the first few injections he was not actually getting the medication. The last couple of times have been correct however, and he has started to notice a difference in the appetite. He is more determined to make some changes to help with the sugar. Hypertension - he is taking the losartan-HCTZ. No side effects. He continues to have stress, which is likely contributing. Review of Systems       Objective   Physical Exam  Vitals reviewed.    Constitutional:       General: He is not in acute distress. Appearance: Normal appearance. He is well-developed. HENT:      Head: Normocephalic and atraumatic. Cardiovascular:      Rate and Rhythm: Normal rate and regular rhythm. Heart sounds: Normal heart sounds. Pulmonary:      Effort: Pulmonary effort is normal. No respiratory distress. Breath sounds: Normal breath sounds. Skin:     General: Skin is warm and dry. Neurological:      Mental Status: He is alert. Psychiatric:         Behavior: Behavior normal.         Thought Content: Thought content normal.         Judgment: Judgment normal.                An electronic signature was used to authenticate this note.     --Hellen May MD

## 2023-02-26 PROBLEM — I10 PRIMARY HYPERTENSION: Status: ACTIVE | Noted: 2023-02-26

## 2023-02-26 PROBLEM — R79.89 LOW TESTOSTERONE IN MALE: Status: ACTIVE | Noted: 2023-02-26

## 2023-02-27 RX ORDER — SEMAGLUTIDE 1.34 MG/ML
0.5 INJECTION, SOLUTION SUBCUTANEOUS WEEKLY
Qty: 1.5 ML | Refills: 2 | Status: SHIPPED | OUTPATIENT
Start: 2023-02-27

## 2023-03-22 ENCOUNTER — PATIENT MESSAGE (OUTPATIENT)
Dept: INTERNAL MEDICINE CLINIC | Age: 58
End: 2023-03-22

## 2023-03-23 NOTE — TELEPHONE ENCOUNTER
From: Michelle Adams  To: Dr. Luis Payne: 3/22/2023 5:04 PM EDT  Subject: New patient     Hello, I called to make a new patient appointment for me, and was told that you weren't taking any new patients at this time. I really feel comfortable with you, and it would be nice for me and Bryan Lujan to have the same doctor again. (Bryan Lujan told me the last time we were there to make a appointment.) So would you PLEASE consider taking me as a patient?     Thanks!!  Wagner  [phone]

## 2023-05-12 ENCOUNTER — TELEPHONE (OUTPATIENT)
Dept: INTERNAL MEDICINE CLINIC | Age: 58
End: 2023-05-12

## 2023-05-12 NOTE — TELEPHONE ENCOUNTER
----- Message from Bowen Tavares sent at 5/12/2023 12:57 PM EDT -----  Subject: Appointment Request    Reason for Call: Established Patient Appointment needed: Routine Existing   Condition Follow Up (Diabetes)    QUESTIONS    Reason for appointment request? Available appointments did not meet   patient need     Additional Information for Provider? Pt was hoping to reschedule his   upcoming appt on 5/26 for a sugar check. Next avail appt in not until 6/22   and pt is not comfortable scheduling that far out. They were hoping for an   appt on 6/ or 6/2.  Please return call to Clarisse Murphy to reschedule.   ---------------------------------------------------------------------------  --------------  Rose Mary STODDARD  [phone]; OK to leave message on voicemail  ---------------------------------------------------------------------------  --------------  SCRIPT ANSWERS  COVID Screen: Dom Pierce

## 2023-05-27 DIAGNOSIS — Z83.49 FAMILY HISTORY OF THYROID DISEASE: ICD-10-CM

## 2023-05-27 DIAGNOSIS — R79.89 LOW TESTOSTERONE IN MALE: ICD-10-CM

## 2023-05-27 DIAGNOSIS — E11.65 TYPE 2 DIABETES MELLITUS WITH HYPERGLYCEMIA, WITHOUT LONG-TERM CURRENT USE OF INSULIN (HCC): ICD-10-CM

## 2023-05-27 DIAGNOSIS — Z12.5 SCREENING FOR MALIGNANT NEOPLASM OF PROSTATE: ICD-10-CM

## 2023-05-27 LAB
ALBUMIN SERPL-MCNC: 4.4 G/DL (ref 3.4–5)
ALBUMIN/GLOB SERPL: 1.8 {RATIO} (ref 1.1–2.2)
ALP SERPL-CCNC: 95 U/L (ref 40–129)
ALT SERPL-CCNC: 57 U/L (ref 10–40)
ANION GAP SERPL CALCULATED.3IONS-SCNC: 12 MMOL/L (ref 3–16)
AST SERPL-CCNC: 55 U/L (ref 15–37)
BASOPHILS # BLD: 0 K/UL (ref 0–0.2)
BASOPHILS NFR BLD: 0.5 %
BILIRUB SERPL-MCNC: 0.5 MG/DL (ref 0–1)
BUN SERPL-MCNC: 18 MG/DL (ref 7–20)
CALCIUM SERPL-MCNC: 9.6 MG/DL (ref 8.3–10.6)
CHLORIDE SERPL-SCNC: 102 MMOL/L (ref 99–110)
CHOLEST SERPL-MCNC: 134 MG/DL (ref 0–199)
CO2 SERPL-SCNC: 26 MMOL/L (ref 21–32)
CREAT SERPL-MCNC: 0.7 MG/DL (ref 0.9–1.3)
DEPRECATED RDW RBC AUTO: 13.6 % (ref 12.4–15.4)
EOSINOPHIL # BLD: 0.1 K/UL (ref 0–0.6)
EOSINOPHIL NFR BLD: 1.3 %
GFR SERPLBLD CREATININE-BSD FMLA CKD-EPI: >60 ML/MIN/{1.73_M2}
GLUCOSE SERPL-MCNC: 216 MG/DL (ref 70–99)
HCT VFR BLD AUTO: 44.6 % (ref 40.5–52.5)
HDLC SERPL-MCNC: 45 MG/DL (ref 40–60)
HGB BLD-MCNC: 15 G/DL (ref 13.5–17.5)
LDLC SERPL CALC-MCNC: 58 MG/DL
LYMPHOCYTES # BLD: 2.5 K/UL (ref 1–5.1)
LYMPHOCYTES NFR BLD: 32.6 %
MCH RBC QN AUTO: 30.9 PG (ref 26–34)
MCHC RBC AUTO-ENTMCNC: 33.7 G/DL (ref 31–36)
MCV RBC AUTO: 91.6 FL (ref 80–100)
MONOCYTES # BLD: 0.8 K/UL (ref 0–1.3)
MONOCYTES NFR BLD: 10 %
NEUTROPHILS # BLD: 4.2 K/UL (ref 1.7–7.7)
NEUTROPHILS NFR BLD: 55.6 %
PLATELET # BLD AUTO: 124 K/UL (ref 135–450)
PMV BLD AUTO: 11 FL (ref 5–10.5)
POTASSIUM SERPL-SCNC: 4.6 MMOL/L (ref 3.5–5.1)
PROT SERPL-MCNC: 6.9 G/DL (ref 6.4–8.2)
PSA SERPL DL<=0.01 NG/ML-MCNC: 0.77 NG/ML (ref 0–4)
RBC # BLD AUTO: 4.87 M/UL (ref 4.2–5.9)
SODIUM SERPL-SCNC: 140 MMOL/L (ref 136–145)
T3FREE SERPL-MCNC: 3 PG/ML (ref 2.3–4.2)
T4 FREE SERPL-MCNC: 0.9 NG/DL (ref 0.9–1.8)
TRIGL SERPL-MCNC: 155 MG/DL (ref 0–150)
VLDLC SERPL CALC-MCNC: 31 MG/DL
WBC # BLD AUTO: 7.7 K/UL (ref 4–11)

## 2023-05-28 LAB
EST. AVERAGE GLUCOSE BLD GHB EST-MCNC: 220.2 MG/DL
HBA1C MFR BLD: 9.3 %

## 2023-05-31 LAB
SHBG SERPL-SCNC: 8 NMOL/L (ref 11–80)
TESTOST FREE SERPL-MCNC: 57.8 PG/ML (ref 47–244)
TESTOST SERPL-MCNC: 174 NG/DL (ref 220–1000)

## 2023-06-01 ENCOUNTER — OFFICE VISIT (OUTPATIENT)
Dept: INTERNAL MEDICINE CLINIC | Age: 58
End: 2023-06-01
Payer: COMMERCIAL

## 2023-06-01 VITALS
SYSTOLIC BLOOD PRESSURE: 130 MMHG | OXYGEN SATURATION: 98 % | WEIGHT: 315 LBS | BODY MASS INDEX: 39.17 KG/M2 | HEIGHT: 75 IN | HEART RATE: 78 BPM | TEMPERATURE: 97.5 F | DIASTOLIC BLOOD PRESSURE: 80 MMHG

## 2023-06-01 DIAGNOSIS — L84 CALLUS OF TOE: ICD-10-CM

## 2023-06-01 DIAGNOSIS — E11.65 TYPE 2 DIABETES MELLITUS WITH HYPERGLYCEMIA, WITHOUT LONG-TERM CURRENT USE OF INSULIN (HCC): Primary | ICD-10-CM

## 2023-06-01 DIAGNOSIS — R79.89 LOW TESTOSTERONE IN MALE: ICD-10-CM

## 2023-06-01 DIAGNOSIS — R79.89 ELEVATED LFTS: ICD-10-CM

## 2023-06-01 DIAGNOSIS — D69.6 THROMBOCYTOPENIA (HCC): ICD-10-CM

## 2023-06-01 DIAGNOSIS — I10 PRIMARY HYPERTENSION: ICD-10-CM

## 2023-06-01 PROCEDURE — 2022F DILAT RTA XM EVC RTNOPTHY: CPT | Performed by: INTERNAL MEDICINE

## 2023-06-01 PROCEDURE — G8427 DOCREV CUR MEDS BY ELIG CLIN: HCPCS | Performed by: INTERNAL MEDICINE

## 2023-06-01 PROCEDURE — 3017F COLORECTAL CA SCREEN DOC REV: CPT | Performed by: INTERNAL MEDICINE

## 2023-06-01 PROCEDURE — 3046F HEMOGLOBIN A1C LEVEL >9.0%: CPT | Performed by: INTERNAL MEDICINE

## 2023-06-01 PROCEDURE — 3079F DIAST BP 80-89 MM HG: CPT | Performed by: INTERNAL MEDICINE

## 2023-06-01 PROCEDURE — 1036F TOBACCO NON-USER: CPT | Performed by: INTERNAL MEDICINE

## 2023-06-01 PROCEDURE — G8417 CALC BMI ABV UP PARAM F/U: HCPCS | Performed by: INTERNAL MEDICINE

## 2023-06-01 PROCEDURE — 99214 OFFICE O/P EST MOD 30 MIN: CPT | Performed by: INTERNAL MEDICINE

## 2023-06-01 PROCEDURE — 3075F SYST BP GE 130 - 139MM HG: CPT | Performed by: INTERNAL MEDICINE

## 2023-06-01 RX ORDER — ATORVASTATIN CALCIUM 10 MG/1
10 TABLET, FILM COATED ORAL DAILY
Qty: 90 TABLET | Refills: 3 | Status: SHIPPED | OUTPATIENT
Start: 2023-06-01

## 2023-06-01 RX ORDER — AZELASTINE 1 MG/ML
1 SPRAY, METERED NASAL 2 TIMES DAILY
Qty: 60 ML | Refills: 1 | Status: SHIPPED | OUTPATIENT
Start: 2023-06-01

## 2023-06-01 RX ORDER — METFORMIN HYDROCHLORIDE 500 MG/1
1000 TABLET, EXTENDED RELEASE ORAL
Qty: 180 TABLET | Refills: 1 | Status: SHIPPED | OUTPATIENT
Start: 2023-06-01

## 2023-06-01 RX ORDER — SEMAGLUTIDE 1.34 MG/ML
1 INJECTION, SOLUTION SUBCUTANEOUS WEEKLY
Qty: 3 ML | Refills: 2 | Status: SHIPPED | OUTPATIENT
Start: 2023-06-01

## 2023-06-01 ASSESSMENT — PATIENT HEALTH QUESTIONNAIRE - PHQ9
SUM OF ALL RESPONSES TO PHQ QUESTIONS 1-9: 0
SUM OF ALL RESPONSES TO PHQ QUESTIONS 1-9: 0
1. LITTLE INTEREST OR PLEASURE IN DOING THINGS: 0
SUM OF ALL RESPONSES TO PHQ9 QUESTIONS 1 & 2: 0
SUM OF ALL RESPONSES TO PHQ QUESTIONS 1-9: 0
SUM OF ALL RESPONSES TO PHQ QUESTIONS 1-9: 0
2. FEELING DOWN, DEPRESSED OR HOPELESS: 0

## 2023-06-01 NOTE — PROGRESS NOTES
Augusta Vaughan (:  1965) is a 62 y.o. male,Established patient, here for evaluation of the following chief complaint(s):  Diabetes, Hypertension, and Hyperlipidemia (/)         ASSESSMENT/PLAN:  1. Type 2 diabetes mellitus with hyperglycemia, without long-term current use of insulin (HCC)  -Uncontrolled. Increase Ozempic to 1 mg weekly. Continue metformin  -     Comprehensive Metabolic Panel; Future  -     Lipid Panel; Future  -     CBC with Auto Differential; Future  -     Hemoglobin A1C; Future  2. Primary hypertension   -Controlled, continue losartan/HCTZ 50-12.5 mg daily  3. Callus of toe  -Refer to podiatry, discussed using pads to offload the pressure  -     CAR[facility] - [place] Ferrell, Jessi Schmidt, DPM, Podiat[facility], [facility]  4. Elevated LFTs  -Persistent. We will check radiographs and ultrasound  -     US ABDOMEN LIMITED; Future  5. Low testosterone in male  -Slightly improved on this check, will reassess at next visit, at this point he does not feel strongly about treating the low testosterone  -     Testosterone, free, total; Future      Return in about 3 months (around 2023) for DM follow up. Subjective   SUBJECTIVE/OBJECTIVE:  HPI    Type 2 diabetes -he is doing well with the 0.5 mg dose of the Ozempic. No significant side effects at this point. Still working on diet. He is also taking the metformin every day. He has a painful callus on the left foot. It has been there for a while. Sometimes he will file it down because it gets so big. He does not see podiatry. No numbness in the feet. He is taking the blood pressure medication every day. No lightheadedness or dizziness. Review of Systems       Objective   Physical Exam  Vitals reviewed. Constitutional:       General: He is not in acute distress. Appearance: Normal appearance. He is well-developed. HENT:      Head: Normocephalic and atraumatic. Cardiovascular:      Rate and Rhythm: Normal rate and regular rhythm.

## 2023-06-22 ENCOUNTER — TELEPHONE (OUTPATIENT)
Dept: INTERNAL MEDICINE CLINIC | Age: 58
End: 2023-06-22

## 2023-06-23 NOTE — TELEPHONE ENCOUNTER
Submitted PA for Ozempic (1 MG/DOSE) 4MG/3ML pen-injectors  Via CMM Key: [id number] STATUS: Drug is covered by current benefit plan. No further PA activity needed. If this requires a response please respond to the pool ( [phone]). Thank you please advise patient.

## 2023-08-08 ENCOUNTER — HOSPITAL ENCOUNTER (OUTPATIENT)
Dept: ULTRASOUND IMAGING | Age: 58
Discharge: HOME | End: 2023-08-08
Payer: COMMERCIAL

## 2023-08-08 DIAGNOSIS — R79.89 ELEVATED LFTS: ICD-10-CM

## 2023-08-08 PROCEDURE — 76705 ECHO EXAM OF ABDOMEN: CPT

## 2023-08-17 RX ORDER — LOSARTAN POTASSIUM AND HYDROCHLOROTHIAZIDE 12.5; 5 MG/1; MG/1
1 TABLET ORAL DAILY
Qty: 90 TABLET | Refills: 1 | OUTPATIENT
Start: 2023-08-17

## 2023-08-17 RX ORDER — LOSARTAN POTASSIUM AND HYDROCHLOROTHIAZIDE 12.5; 5 MG/1; MG/1
1 TABLET ORAL DAILY
Qty: 90 TABLET | Refills: 1 | Status: SHIPPED | OUTPATIENT
Start: 2023-08-17

## 2023-08-26 DIAGNOSIS — R79.89 LOW TESTOSTERONE IN MALE: ICD-10-CM

## 2023-08-26 DIAGNOSIS — E11.65 TYPE 2 DIABETES MELLITUS WITH HYPERGLYCEMIA, WITHOUT LONG-TERM CURRENT USE OF INSULIN (HCC): ICD-10-CM

## 2023-08-26 LAB
ALBUMIN SERPL-MCNC: 4.4 G/DL (ref 3.4–5)
ALBUMIN/GLOB SERPL: 1.8 {RATIO} (ref 1.1–2.2)
ALP SERPL-CCNC: 96 U/L (ref 40–129)
ALT SERPL-CCNC: 48 U/L (ref 10–40)
ANION GAP SERPL CALCULATED.3IONS-SCNC: 14 MMOL/L (ref 3–16)
AST SERPL-CCNC: 56 U/L (ref 15–37)
BASOPHILS # BLD: 0 K/UL (ref 0–0.2)
BASOPHILS NFR BLD: 0.6 %
BILIRUB SERPL-MCNC: 0.5 MG/DL (ref 0–1)
BUN SERPL-MCNC: 20 MG/DL (ref 7–20)
CALCIUM SERPL-MCNC: 9.2 MG/DL (ref 8.3–10.6)
CHLORIDE SERPL-SCNC: 99 MMOL/L (ref 99–110)
CHOLEST SERPL-MCNC: 98 MG/DL (ref 0–199)
CO2 SERPL-SCNC: 24 MMOL/L (ref 21–32)
CREAT SERPL-MCNC: 0.8 MG/DL (ref 0.9–1.3)
DEPRECATED RDW RBC AUTO: 12.9 % (ref 12.4–15.4)
EOSINOPHIL # BLD: 0.1 K/UL (ref 0–0.6)
EOSINOPHIL NFR BLD: 1.8 %
EST. AVERAGE GLUCOSE BLD GHB EST-MCNC: 231.7 MG/DL
GFR SERPLBLD CREATININE-BSD FMLA CKD-EPI: >60 ML/MIN/{1.73_M2}
GLUCOSE SERPL-MCNC: 291 MG/DL (ref 70–99)
HBA1C MFR BLD: 9.7 %
HCT VFR BLD AUTO: 42.8 % (ref 40.5–52.5)
HDLC SERPL-MCNC: 34 MG/DL (ref 40–60)
HGB BLD-MCNC: 14.3 G/DL (ref 13.5–17.5)
LDLC SERPL CALC-MCNC: 39 MG/DL
LYMPHOCYTES # BLD: 2.2 K/UL (ref 1–5.1)
LYMPHOCYTES NFR BLD: 33.3 %
MCH RBC QN AUTO: 30.9 PG (ref 26–34)
MCHC RBC AUTO-ENTMCNC: 33.5 G/DL (ref 31–36)
MCV RBC AUTO: 92.3 FL (ref 80–100)
MONOCYTES # BLD: 0.7 K/UL (ref 0–1.3)
MONOCYTES NFR BLD: 10.8 %
NEUTROPHILS # BLD: 3.6 K/UL (ref 1.7–7.7)
NEUTROPHILS NFR BLD: 53.5 %
PLATELET # BLD AUTO: 116 K/UL (ref 135–450)
PMV BLD AUTO: 11.1 FL (ref 5–10.5)
POTASSIUM SERPL-SCNC: 4.6 MMOL/L (ref 3.5–5.1)
PROT SERPL-MCNC: 6.8 G/DL (ref 6.4–8.2)
RBC # BLD AUTO: 4.64 M/UL (ref 4.2–5.9)
SODIUM SERPL-SCNC: 137 MMOL/L (ref 136–145)
TRIGL SERPL-MCNC: 124 MG/DL (ref 0–150)
VLDLC SERPL CALC-MCNC: 25 MG/DL
WBC # BLD AUTO: 6.7 K/UL (ref 4–11)

## 2023-08-29 LAB
SHBG SERPL-SCNC: 9 NMOL/L (ref 11–80)
TESTOST FREE SERPL-MCNC: 41.5 PG/ML (ref 47–244)
TESTOST SERPL-MCNC: 130 NG/DL (ref 220–1000)

## 2023-09-01 ENCOUNTER — TELEPHONE (OUTPATIENT)
Dept: INTERNAL MEDICINE CLINIC | Age: 58
End: 2023-09-01

## 2023-09-01 ENCOUNTER — OFFICE VISIT (OUTPATIENT)
Dept: INTERNAL MEDICINE CLINIC | Age: 58
End: 2023-09-01
Payer: COMMERCIAL

## 2023-09-01 VITALS
HEIGHT: 76 IN | DIASTOLIC BLOOD PRESSURE: 70 MMHG | BODY MASS INDEX: 38.24 KG/M2 | WEIGHT: 314 LBS | SYSTOLIC BLOOD PRESSURE: 130 MMHG

## 2023-09-01 DIAGNOSIS — R79.89 LOW TESTOSTERONE: ICD-10-CM

## 2023-09-01 DIAGNOSIS — E11.65 TYPE 2 DIABETES MELLITUS WITH HYPERGLYCEMIA, WITHOUT LONG-TERM CURRENT USE OF INSULIN (HCC): Primary | ICD-10-CM

## 2023-09-01 DIAGNOSIS — R79.89 ELEVATED LFTS: ICD-10-CM

## 2023-09-01 PROCEDURE — G8427 DOCREV CUR MEDS BY ELIG CLIN: HCPCS | Performed by: INTERNAL MEDICINE

## 2023-09-01 PROCEDURE — 1036F TOBACCO NON-USER: CPT | Performed by: INTERNAL MEDICINE

## 2023-09-01 PROCEDURE — G8417 CALC BMI ABV UP PARAM F/U: HCPCS | Performed by: INTERNAL MEDICINE

## 2023-09-01 PROCEDURE — 2022F DILAT RTA XM EVC RTNOPTHY: CPT | Performed by: INTERNAL MEDICINE

## 2023-09-01 PROCEDURE — 99214 OFFICE O/P EST MOD 30 MIN: CPT | Performed by: INTERNAL MEDICINE

## 2023-09-01 PROCEDURE — 3046F HEMOGLOBIN A1C LEVEL >9.0%: CPT | Performed by: INTERNAL MEDICINE

## 2023-09-01 PROCEDURE — 3075F SYST BP GE 130 - 139MM HG: CPT | Performed by: INTERNAL MEDICINE

## 2023-09-01 PROCEDURE — 3017F COLORECTAL CA SCREEN DOC REV: CPT | Performed by: INTERNAL MEDICINE

## 2023-09-01 PROCEDURE — 3078F DIAST BP <80 MM HG: CPT | Performed by: INTERNAL MEDICINE

## 2023-09-01 RX ORDER — SITAGLIPTIN AND METFORMIN HYDROCHLORIDE 1000; 100 MG/1; MG/1
1 TABLET, FILM COATED, EXTENDED RELEASE ORAL DAILY
Qty: 90 TABLET | Refills: 1 | Status: SHIPPED | OUTPATIENT
Start: 2023-09-01

## 2023-09-01 RX ORDER — KETOCONAZOLE 20 MG/ML
SHAMPOO TOPICAL
Qty: 1 EACH | Refills: 3 | Status: SHIPPED | OUTPATIENT
Start: 2023-09-01

## 2023-09-01 NOTE — TELEPHONE ENCOUNTER
Lucy Peterson states there was 4 lab orders added on and 2 of them can not be added due to them being past stability. Lucy Peterson is asking if Dr. Roxy Bojorquez would like for patient to come back in and have all four done.     Please call and advise [phone]

## 2023-09-01 NOTE — PROGRESS NOTES
Exam  Vitals reviewed. Constitutional:       General: He is not in acute distress. Appearance: Normal appearance. He is well-developed. He is obese. HENT:      Head: Normocephalic and atraumatic. Cardiovascular:      Rate and Rhythm: Normal rate and regular rhythm. Heart sounds: Normal heart sounds. Pulmonary:      Effort: Pulmonary effort is normal. No respiratory distress. Breath sounds: Normal breath sounds. Skin:     General: Skin is warm and dry. Neurological:      Mental Status: He is alert. Psychiatric:         Behavior: Behavior normal.         Thought Content: Thought content normal.         Judgment: Judgment normal.                An electronic signature was used to authenticate this note.     --Alyssa Ponce MD

## 2023-09-05 DIAGNOSIS — R79.89 ELEVATED LFTS: ICD-10-CM

## 2023-09-06 ENCOUNTER — PATIENT MESSAGE (OUTPATIENT)
Dept: INTERNAL MEDICINE CLINIC | Age: 58
End: 2023-09-06

## 2023-09-06 DIAGNOSIS — E11.65 TYPE 2 DIABETES MELLITUS WITH HYPERGLYCEMIA, WITHOUT LONG-TERM CURRENT USE OF INSULIN (HCC): Primary | ICD-10-CM

## 2023-09-06 LAB
HBV CORE AB SERPL QL IA: NEGATIVE
HBV SURFACE AB SERPL IA-ACNC: <3.5 MIU/ML
HBV SURFACE AG SERPL QL IA: NORMAL
HCV AB SERPL QL IA: NORMAL

## 2023-09-06 NOTE — TELEPHONE ENCOUNTER
From: Merlin Coots  To: Dr. Nurys Santos: 9/6/2023 12:47 PM EDT  Subject: Referral to Endocrinologist    Could we get a referral for Dr Luciana Tellez at [facility]? He is new, our daughters friend works there and said that we could get in sooner than November with him. Fax - [phone]    Thanks!

## 2023-11-25 DIAGNOSIS — E11.65 TYPE 2 DIABETES MELLITUS WITH HYPERGLYCEMIA, WITHOUT LONG-TERM CURRENT USE OF INSULIN (HCC): ICD-10-CM

## 2023-11-25 LAB
ALBUMIN SERPL-MCNC: 4.2 G/DL (ref 3.4–5)
ALBUMIN/GLOB SERPL: 1.6 {RATIO} (ref 1.1–2.2)
ALP SERPL-CCNC: 83 U/L (ref 40–129)
ALT SERPL-CCNC: 40 U/L (ref 10–40)
ANION GAP SERPL CALCULATED.3IONS-SCNC: 10 MMOL/L (ref 3–16)
AST SERPL-CCNC: 34 U/L (ref 15–37)
BASOPHILS # BLD: 0 K/UL (ref 0–0.2)
BASOPHILS NFR BLD: 0.4 %
BILIRUB SERPL-MCNC: 0.3 MG/DL (ref 0–1)
BUN SERPL-MCNC: 21 MG/DL (ref 7–20)
CALCIUM SERPL-MCNC: 9.4 MG/DL (ref 8.3–10.6)
CHLORIDE SERPL-SCNC: 103 MMOL/L (ref 99–110)
CHOLEST SERPL-MCNC: 109 MG/DL (ref 0–199)
CO2 SERPL-SCNC: 26 MMOL/L (ref 21–32)
CREAT SERPL-MCNC: 0.8 MG/DL (ref 0.9–1.3)
DEPRECATED RDW RBC AUTO: 13.6 % (ref 12.4–15.4)
EOSINOPHIL # BLD: 0.1 K/UL (ref 0–0.6)
EOSINOPHIL NFR BLD: 0.7 %
GFR SERPLBLD CREATININE-BSD FMLA CKD-EPI: >60 ML/MIN/{1.73_M2}
GLUCOSE SERPL-MCNC: 147 MG/DL (ref 70–99)
HCT VFR BLD AUTO: 46 % (ref 40.5–52.5)
HDLC SERPL-MCNC: 38 MG/DL (ref 40–60)
HGB BLD-MCNC: 15.3 G/DL (ref 13.5–17.5)
LDLC SERPL CALC-MCNC: 52 MG/DL
LYMPHOCYTES # BLD: 2.5 K/UL (ref 1–5.1)
LYMPHOCYTES NFR BLD: 23.9 %
MCH RBC QN AUTO: 30.9 PG (ref 26–34)
MCHC RBC AUTO-ENTMCNC: 33.2 G/DL (ref 31–36)
MCV RBC AUTO: 93 FL (ref 80–100)
MONOCYTES # BLD: 0.8 K/UL (ref 0–1.3)
MONOCYTES NFR BLD: 7.8 %
NEUTROPHILS # BLD: 7 K/UL (ref 1.7–7.7)
NEUTROPHILS NFR BLD: 67.2 %
PLATELET # BLD AUTO: 164 K/UL (ref 135–450)
PMV BLD AUTO: 10.6 FL (ref 5–10.5)
POTASSIUM SERPL-SCNC: 4.9 MMOL/L (ref 3.5–5.1)
PROT SERPL-MCNC: 6.9 G/DL (ref 6.4–8.2)
RBC # BLD AUTO: 4.95 M/UL (ref 4.2–5.9)
SODIUM SERPL-SCNC: 139 MMOL/L (ref 136–145)
TRIGL SERPL-MCNC: 97 MG/DL (ref 0–150)
VLDLC SERPL CALC-MCNC: 19 MG/DL
WBC # BLD AUTO: 10.4 K/UL (ref 4–11)

## 2023-11-26 LAB
EST. AVERAGE GLUCOSE BLD GHB EST-MCNC: 180 MG/DL
HBA1C MFR BLD: 7.9 %

## 2023-12-01 ENCOUNTER — OFFICE VISIT (OUTPATIENT)
Dept: INTERNAL MEDICINE CLINIC | Age: 58
End: 2023-12-01
Payer: COMMERCIAL

## 2023-12-01 VITALS
WEIGHT: 294 LBS | BODY MASS INDEX: 35.79 KG/M2 | HEART RATE: 80 BPM | DIASTOLIC BLOOD PRESSURE: 82 MMHG | OXYGEN SATURATION: 98 % | TEMPERATURE: 98.6 F | SYSTOLIC BLOOD PRESSURE: 134 MMHG

## 2023-12-01 DIAGNOSIS — I10 PRIMARY HYPERTENSION: ICD-10-CM

## 2023-12-01 DIAGNOSIS — E11.65 TYPE 2 DIABETES MELLITUS WITH HYPERGLYCEMIA, WITHOUT LONG-TERM CURRENT USE OF INSULIN (HCC): Primary | ICD-10-CM

## 2023-12-01 DIAGNOSIS — R79.89 ELEVATED LFTS: ICD-10-CM

## 2023-12-01 PROBLEM — D69.6 THROMBOCYTOPENIA (HCC): Status: RESOLVED | Noted: 2020-05-29 | Resolved: 2023-12-01

## 2023-12-01 PROCEDURE — G8484 FLU IMMUNIZE NO ADMIN: HCPCS | Performed by: INTERNAL MEDICINE

## 2023-12-01 PROCEDURE — 3051F HG A1C>EQUAL 7.0%<8.0%: CPT | Performed by: INTERNAL MEDICINE

## 2023-12-01 PROCEDURE — 3075F SYST BP GE 130 - 139MM HG: CPT | Performed by: INTERNAL MEDICINE

## 2023-12-01 PROCEDURE — 2022F DILAT RTA XM EVC RTNOPTHY: CPT | Performed by: INTERNAL MEDICINE

## 2023-12-01 PROCEDURE — G8417 CALC BMI ABV UP PARAM F/U: HCPCS | Performed by: INTERNAL MEDICINE

## 2023-12-01 PROCEDURE — 3017F COLORECTAL CA SCREEN DOC REV: CPT | Performed by: INTERNAL MEDICINE

## 2023-12-01 PROCEDURE — 3079F DIAST BP 80-89 MM HG: CPT | Performed by: INTERNAL MEDICINE

## 2023-12-01 PROCEDURE — G8427 DOCREV CUR MEDS BY ELIG CLIN: HCPCS | Performed by: INTERNAL MEDICINE

## 2023-12-01 PROCEDURE — 99214 OFFICE O/P EST MOD 30 MIN: CPT | Performed by: INTERNAL MEDICINE

## 2023-12-01 PROCEDURE — 1036F TOBACCO NON-USER: CPT | Performed by: INTERNAL MEDICINE

## 2023-12-01 RX ORDER — LOSARTAN POTASSIUM AND HYDROCHLOROTHIAZIDE 12.5; 5 MG/1; MG/1
1 TABLET ORAL DAILY
Qty: 90 TABLET | Refills: 1 | Status: SHIPPED | OUTPATIENT
Start: 2023-12-01

## 2023-12-01 RX ORDER — PRAVASTATIN SODIUM 10 MG
10 TABLET ORAL DAILY
COMMUNITY

## 2023-12-01 RX ORDER — KETOCONAZOLE 20 MG/ML
SHAMPOO TOPICAL
Qty: 2 EACH | Refills: 3 | Status: SHIPPED | OUTPATIENT
Start: 2023-12-01

## 2023-12-01 RX ORDER — PRAVASTATIN SODIUM 40 MG
40 TABLET ORAL DAILY
COMMUNITY
End: 2023-12-01

## 2023-12-01 RX ORDER — GLUCOSAMINE HCL/CHONDROITIN SU 500-400 MG
CAPSULE ORAL
Qty: 100 STRIP | Refills: 1 | Status: SHIPPED | OUTPATIENT
Start: 2023-12-01

## 2023-12-01 NOTE — PROGRESS NOTES
Amor Hdz (:  1965) is a 62 y.o. male,Established patient, here for evaluation of the following chief complaint(s):  Diabetes, Hypertension, and 3 Month Follow-Up         ASSESSMENT/PLAN:  1. Type 2 diabetes mellitus with hyperglycemia, without long-term current use of insulin (HCC)  -Significantly improved, doing well with the glucose monitor. Continue Mounjaro, metformin per the endocrinologist, refilled Clearence Axe  -     blood glucose monitor strips; Test 1 times a day & as needed for symptoms of irregular blood glucose., Disp-100 strip, R-1, Normal  2. Elevated LFTs   -This has improved, ferritin is down as well though not quite back to normal.  Continue to monitor this. 3. Primary hypertension   -Controlled, continue losartan-HCTZ 50-12.5 mg daily    Return in about 4 months (around 2024) for DM follow up, HTN follow up. Subjective   SUBJECTIVE/OBJECTIVE:  HPI    He has been doing well with the medications endocrinologist has him on, the continuous glucose monitor has been very helpful as well, there was once when it was not reading properly but that has resolved. He is off of insulin and sugars are remaining controlled. They will be evaluating the testosterone again once the sugar is under good control. Blood pressure has been good, taking medications as prescribed. Review of Systems       Objective   Physical Exam  Vitals reviewed. Constitutional:       General: He is not in acute distress. Appearance: Normal appearance. He is well-developed. HENT:      Head: Normocephalic and atraumatic. Cardiovascular:      Rate and Rhythm: Normal rate and regular rhythm. Heart sounds: Normal heart sounds. Pulmonary:      Effort: Pulmonary effort is normal. No respiratory distress. Breath sounds: Normal breath sounds. Skin:     General: Skin is warm and dry. Neurological:      Mental Status: He is alert.    Psychiatric:         Behavior: Behavior normal.
5

## 2024-01-04 ENCOUNTER — TELEPHONE (OUTPATIENT)
Dept: INTERNAL MEDICINE CLINIC | Age: 59
End: 2024-01-04

## 2024-01-04 NOTE — TELEPHONE ENCOUNTER
They are trying to do a PA for dexcom on through [facility] and stated the following.         @Lilian Jones, I looked at the chart notes that were sent over and they do not indicate that the pt is injecting insulin or is having hypoglycemic events. If the pt is on insulin or is having severe hypoglycemic events then the notes will need to be updated to include that information. The insurance does require at least one daily injection or that the pt is having severe hypoglycemia. Just let me know. Thanks!

## 2024-03-04 DIAGNOSIS — I10 PRIMARY HYPERTENSION: ICD-10-CM

## 2024-03-04 DIAGNOSIS — E11.65 TYPE 2 DIABETES MELLITUS WITH HYPERGLYCEMIA, WITHOUT LONG-TERM CURRENT USE OF INSULIN (HCC): ICD-10-CM

## 2024-03-04 LAB
ALBUMIN SERPL-MCNC: 4.7 G/DL (ref 3.4–5)
ALBUMIN/GLOB SERPL: 1.7 {RATIO} (ref 1.1–2.2)
ALP SERPL-CCNC: 87 U/L (ref 40–129)
ALT SERPL-CCNC: 24 U/L (ref 10–40)
ANION GAP SERPL CALCULATED.3IONS-SCNC: 17 MMOL/L (ref 3–16)
AST SERPL-CCNC: 26 U/L (ref 15–37)
BILIRUB SERPL-MCNC: 0.3 MG/DL (ref 0–1)
BUN SERPL-MCNC: 21 MG/DL (ref 7–20)
CALCIUM SERPL-MCNC: 9.8 MG/DL (ref 8.3–10.6)
CHLORIDE SERPL-SCNC: 100 MMOL/L (ref 99–110)
CO2 SERPL-SCNC: 21 MMOL/L (ref 21–32)
CREAT SERPL-MCNC: 0.9 MG/DL (ref 0.9–1.3)
GFR SERPLBLD CREATININE-BSD FMLA CKD-EPI: >60 ML/MIN/{1.73_M2}
GLUCOSE SERPL-MCNC: 134 MG/DL (ref 70–99)
POTASSIUM SERPL-SCNC: 5.1 MMOL/L (ref 3.5–5.1)
PROT SERPL-MCNC: 7.5 G/DL (ref 6.4–8.2)
SODIUM SERPL-SCNC: 138 MMOL/L (ref 136–145)

## 2024-03-09 DIAGNOSIS — I10 PRIMARY HYPERTENSION: ICD-10-CM

## 2024-03-09 DIAGNOSIS — E11.65 TYPE 2 DIABETES MELLITUS WITH HYPERGLYCEMIA, WITHOUT LONG-TERM CURRENT USE OF INSULIN (HCC): ICD-10-CM

## 2024-03-09 LAB
BASOPHILS # BLD: 0 K/UL (ref 0–0.2)
BASOPHILS NFR BLD: 0.4 %
DEPRECATED RDW RBC AUTO: 13.9 % (ref 12.4–15.4)
EOSINOPHIL # BLD: 0.1 K/UL (ref 0–0.6)
EOSINOPHIL NFR BLD: 1.6 %
HCT VFR BLD AUTO: 45.4 % (ref 40.5–52.5)
HGB BLD-MCNC: 15.3 G/DL (ref 13.5–17.5)
LYMPHOCYTES # BLD: 2.4 K/UL (ref 1–5.1)
LYMPHOCYTES NFR BLD: 29.8 %
MCH RBC QN AUTO: 31.2 PG (ref 26–34)
MCHC RBC AUTO-ENTMCNC: 33.7 G/DL (ref 31–36)
MCV RBC AUTO: 92.5 FL (ref 80–100)
MONOCYTES # BLD: 0.8 K/UL (ref 0–1.3)
MONOCYTES NFR BLD: 9.7 %
NEUTROPHILS # BLD: 4.8 K/UL (ref 1.7–7.7)
NEUTROPHILS NFR BLD: 58.5 %
PLATELET # BLD AUTO: 142 K/UL (ref 135–450)
PMV BLD AUTO: 10.3 FL (ref 5–10.5)
RBC # BLD AUTO: 4.91 M/UL (ref 4.2–5.9)
WBC # BLD AUTO: 8.2 K/UL (ref 4–11)

## 2024-03-10 LAB
EST. AVERAGE GLUCOSE BLD GHB EST-MCNC: 145.6 MG/DL
HBA1C MFR BLD: 6.7 %

## 2024-03-22 ENCOUNTER — PATIENT MESSAGE (OUTPATIENT)
Dept: INTERNAL MEDICINE CLINIC | Age: 59
End: 2024-03-22

## 2024-03-22 RX ORDER — DAPAGLIFLOZIN 10 MG/1
10 TABLET, FILM COATED ORAL EVERY MORNING
Qty: 90 TABLET | Refills: 1 | Status: SHIPPED | OUTPATIENT
Start: 2024-03-22

## 2024-03-22 NOTE — TELEPHONE ENCOUNTER
From: Logan Castro  To: Dr. Reina Antoine  Sent: 3/22/2024 11:28 AM EDT  Subject: Refill of FARXIGA     Melvin needs to have his FARXIGA prescription refilled and I didn't want to go through scripts this time. It's not on your list to request, so do I need to ask Dr Moffett for the refill?      I thought he had a couple of refills left at [facility].    Thanks    April Castro   [phone]

## 2024-04-02 SDOH — ECONOMIC STABILITY: FOOD INSECURITY: WITHIN THE PAST 12 MONTHS, YOU WORRIED THAT YOUR FOOD WOULD RUN OUT BEFORE YOU GOT MONEY TO BUY MORE.: PATIENT DECLINED

## 2024-04-02 SDOH — ECONOMIC STABILITY: INCOME INSECURITY: HOW HARD IS IT FOR YOU TO PAY FOR THE VERY BASICS LIKE FOOD, HOUSING, MEDICAL CARE, AND HEATING?: PATIENT DECLINED

## 2024-04-02 SDOH — ECONOMIC STABILITY: FOOD INSECURITY: WITHIN THE PAST 12 MONTHS, THE FOOD YOU BOUGHT JUST DIDN'T LAST AND YOU DIDN'T HAVE MONEY TO GET MORE.: PATIENT DECLINED

## 2024-04-05 ENCOUNTER — OFFICE VISIT (OUTPATIENT)
Dept: INTERNAL MEDICINE CLINIC | Age: 59
End: 2024-04-05
Payer: COMMERCIAL

## 2024-04-05 VITALS
HEIGHT: 73 IN | SYSTOLIC BLOOD PRESSURE: 126 MMHG | DIASTOLIC BLOOD PRESSURE: 70 MMHG | WEIGHT: 280.8 LBS | HEART RATE: 71 BPM | TEMPERATURE: 98.2 F | OXYGEN SATURATION: 98 % | BODY MASS INDEX: 37.22 KG/M2

## 2024-04-05 DIAGNOSIS — E11.9 TYPE 2 DIABETES MELLITUS WITHOUT COMPLICATION, WITHOUT LONG-TERM CURRENT USE OF INSULIN (HCC): ICD-10-CM

## 2024-04-05 DIAGNOSIS — R79.89 LOW TESTOSTERONE IN MALE: ICD-10-CM

## 2024-04-05 DIAGNOSIS — I10 PRIMARY HYPERTENSION: ICD-10-CM

## 2024-04-05 DIAGNOSIS — L29.9 PRURITUS: Primary | ICD-10-CM

## 2024-04-05 PROCEDURE — 1036F TOBACCO NON-USER: CPT | Performed by: INTERNAL MEDICINE

## 2024-04-05 PROCEDURE — 99214 OFFICE O/P EST MOD 30 MIN: CPT | Performed by: INTERNAL MEDICINE

## 2024-04-05 PROCEDURE — 3044F HG A1C LEVEL LT 7.0%: CPT | Performed by: INTERNAL MEDICINE

## 2024-04-05 PROCEDURE — G8417 CALC BMI ABV UP PARAM F/U: HCPCS | Performed by: INTERNAL MEDICINE

## 2024-04-05 PROCEDURE — G8427 DOCREV CUR MEDS BY ELIG CLIN: HCPCS | Performed by: INTERNAL MEDICINE

## 2024-04-05 PROCEDURE — 2022F DILAT RTA XM EVC RTNOPTHY: CPT | Performed by: INTERNAL MEDICINE

## 2024-04-05 PROCEDURE — 3074F SYST BP LT 130 MM HG: CPT | Performed by: INTERNAL MEDICINE

## 2024-04-05 PROCEDURE — 3078F DIAST BP <80 MM HG: CPT | Performed by: INTERNAL MEDICINE

## 2024-04-05 PROCEDURE — 3017F COLORECTAL CA SCREEN DOC REV: CPT | Performed by: INTERNAL MEDICINE

## 2024-04-05 RX ORDER — GLUCOSAMINE SULFATE 500 MG
500 CAPSULE ORAL DAILY
COMMUNITY

## 2024-04-05 RX ORDER — TESTOSTERONE CYPIONATE 200 MG/ML
INJECTION, SOLUTION INTRAMUSCULAR
COMMUNITY
Start: 2024-01-05 | End: 2025-01-01

## 2024-04-05 RX ORDER — LOSARTAN POTASSIUM AND HYDROCHLOROTHIAZIDE 12.5; 5 MG/1; MG/1
1 TABLET ORAL DAILY
Qty: 90 TABLET | Refills: 1 | Status: SHIPPED | OUTPATIENT
Start: 2024-04-05

## 2024-04-05 RX ORDER — TRIAMCINOLONE ACETONIDE 1 MG/G
CREAM TOPICAL
Qty: 30 G | Refills: 1 | Status: SHIPPED | OUTPATIENT
Start: 2024-04-05

## 2024-04-05 ASSESSMENT — PATIENT HEALTH QUESTIONNAIRE - PHQ9
SUM OF ALL RESPONSES TO PHQ QUESTIONS 1-9: 0
1. LITTLE INTEREST OR PLEASURE IN DOING THINGS: NOT AT ALL
2. FEELING DOWN, DEPRESSED OR HOPELESS: NOT AT ALL
SUM OF ALL RESPONSES TO PHQ QUESTIONS 1-9: 0
SUM OF ALL RESPONSES TO PHQ9 QUESTIONS 1 & 2: 0

## 2024-04-05 NOTE — PROGRESS NOTES
Logan Castro (:  1965) is a 58 y.o. male,Established patient, here for evaluation of the following chief complaint(s):  Diabetes and Hypertension         ASSESSMENT/PLAN:  1. Pruritus   -Likely dermatitis, unlikely medication reaction since I would expect it to be more diffuse.  Will treat with triamcinolone 0.1% cream twice daily for up to 14 days at a time.  2. Primary hypertension   -Controlled, continue losartan-HCTZ 50-12.5 mg daily  3. Type 2 diabetes mellitus without complication, without long-term current use of insulin (HCC)   -Now controlled, managed by endocrinology, continue tirzepatide 7.5 mg weekly, Paxil is in 10 mg daily, metformin  4. Low testosterone in male   -Doing well with testosterone injections, managed by endocrinology    Return in about 6 months (around 10/5/2024) for DM follow up, HTN follow up.         Subjective   SUBJECTIVE/OBJECTIVE:  HPI    There is an itchy spot on the left upper back/lower neck area, only 1 patch.  No diffuse pruritus.  Not sure if there is a rash there.  He has tried some diabetic lotion which has not seem to make a difference.    He is doing well with the change in diabetes medications, A1c is at goal.  Mounjaro dose is at 7.5 mg.  No issues with the other medications.    He is taking his blood pressure medication.  No chest pain or shortness of breath.    He is on testosterone injections, has been helpful.  This is prescribed through the endocrinologist.    Review of Systems       Objective   Physical Exam  Vitals reviewed.   Constitutional:       General: He is not in acute distress.     Appearance: Normal appearance. He is well-developed.   HENT:      Head: Normocephalic and atraumatic.   Cardiovascular:      Rate and Rhythm: Normal rate and regular rhythm.      Heart sounds: Normal heart sounds.   Pulmonary:      Effort: Pulmonary effort is normal. No respiratory distress.      Breath sounds: Normal breath sounds.   Skin:     General: Skin is

## 2024-07-01 RX ORDER — LOSARTAN POTASSIUM AND HYDROCHLOROTHIAZIDE 12.5; 5 MG/1; MG/1
1 TABLET ORAL DAILY
Qty: 90 TABLET | Refills: 1 | Status: SHIPPED | OUTPATIENT
Start: 2024-07-01

## 2024-10-04 ENCOUNTER — OFFICE VISIT (OUTPATIENT)
Dept: INTERNAL MEDICINE CLINIC | Age: 59
End: 2024-10-04

## 2024-10-04 VITALS
SYSTOLIC BLOOD PRESSURE: 134 MMHG | OXYGEN SATURATION: 98 % | WEIGHT: 284.2 LBS | BODY MASS INDEX: 37.67 KG/M2 | HEART RATE: 71 BPM | TEMPERATURE: 98.1 F | DIASTOLIC BLOOD PRESSURE: 78 MMHG | HEIGHT: 73 IN

## 2024-10-04 DIAGNOSIS — E11.9 TYPE 2 DIABETES MELLITUS WITHOUT COMPLICATION, WITHOUT LONG-TERM CURRENT USE OF INSULIN (HCC): Primary | ICD-10-CM

## 2024-10-04 DIAGNOSIS — R79.89 LOW TESTOSTERONE IN MALE: ICD-10-CM

## 2024-10-04 DIAGNOSIS — E78.2 HYPERLIPIDEMIA, MIXED: ICD-10-CM

## 2024-10-04 DIAGNOSIS — I10 PRIMARY HYPERTENSION: ICD-10-CM

## 2024-10-04 RX ORDER — TESTOSTERONE ENANTHATE 100 MG/.5ML
INJECTION SUBCUTANEOUS
COMMUNITY
Start: 2024-09-29

## 2024-10-04 RX ORDER — LOSARTAN POTASSIUM AND HYDROCHLOROTHIAZIDE 12.5; 5 MG/1; MG/1
1 TABLET ORAL DAILY
Qty: 90 TABLET | Refills: 1 | Status: SHIPPED | OUTPATIENT
Start: 2024-10-04

## 2024-10-04 RX ORDER — AZELASTINE 1 MG/ML
1 SPRAY, METERED NASAL 2 TIMES DAILY
Qty: 3 EACH | Refills: 3 | Status: SHIPPED | OUTPATIENT
Start: 2024-10-04

## 2024-10-04 NOTE — ASSESSMENT & PLAN NOTE
Controlled - managed by Endo   On Mounjaro (with some constipation), Metformin, and  Farxiga   -No hypoglycemia episodes  -Last A1C:   Hemoglobin A1C   Date Value Ref Range Status   07/27/2024 6.7 See comment % Final     Comment:     Comment:  Diagnosis of Diabetes: > or = 6.5%  Increased risk of diabetes (Prediabetes): 5.7-6.4%  Glycemic Control: Nonpregnant Adults: <7.0%                    Pregnant: <6.0%          -Hyperlipidemia, LDL goal is <70 mg/dl, on Statin  -Microalbumin: Due  -Hypertension: goal < 130/80  -Foot exam: Due   -Eye exam: Up to date 3/2024 no diabetic retinopathy, positive cataract  -Declined pneumococcal vaccine    The patient was advised to monitor blood sugar at home.   Continue low carb diet.  Diabetes Care: recommend yearly eye exam, foot exam and urine microalbumin to creatinine ratio.

## 2024-10-04 NOTE — PROGRESS NOTES
Value Date    .6 07/27/2024         Physical Exam:  Vital Signs: /78 (Site: Left Upper Arm, Position: Sitting)   Pulse 71   Temp 98.1 °F (36.7 °C) (Temporal)   Ht 1.854 m (6' 1\")   Wt 128.9 kg (284 lb 3.2 oz)   SpO2 98%   BMI 37.50 kg/m²   Constitutional:  Well developed, well nourished, no acute distress, non-toxic appearance   Eyes:  PERRL, conjunctiva normal   HENT:  Atraumatic, external ears normal, nose normal, oropharynx moist, no pharyngeal exudates. Neck- normal range of motion, no tenderness, supple   Respiratory:  No respiratory distress, normal breath sounds, no rales, no wheezing   Cardiovascular:  Normal rate, normal rhythm, no murmurs, no gallops, no rubs   GI:  Soft, nondistended, normal bowel sounds, nontender, no organomegaly, no mass, no rebound, no guarding   :  No costovertebral angle tenderness   Musculoskeletal:  No edema, no tenderness, no deformities. Back- no tenderness  Integument:  Well hydrated, no rash   Lymphatic:  No lymphadenopathy noted   Neurologic:  Alert & oriented x 3, CN 2-12 normal, normal motor function, normal sensory function, no focal deficits noted   Psychiatric:  Speech and behavior appropriate          Please note that this chart was generated using dragon dictation software.  Although every effort was made to ensure the accuracy of this automated transcription, some errors in transcription may have occurred.       --Odette Ghotra MD

## 2024-10-04 NOTE — ASSESSMENT & PLAN NOTE
Blood pressure slightly up today, however improved on repeat.  Will have patient monitor blood pressure at home for the next 4 weeks.  If higher than 130/80, then call the office to adjust medication.  Otherwise continue with same medication at this time lisinopril-hydrochlorothiazide 50-12.5 mg

## 2024-10-26 DIAGNOSIS — E11.9 TYPE 2 DIABETES MELLITUS WITHOUT COMPLICATION, WITHOUT LONG-TERM CURRENT USE OF INSULIN (HCC): ICD-10-CM

## 2024-10-26 DIAGNOSIS — E78.2 HYPERLIPIDEMIA, MIXED: ICD-10-CM

## 2024-10-26 LAB
CHOLEST SERPL-MCNC: 102 MG/DL (ref 0–199)
HDLC SERPL-MCNC: 38 MG/DL (ref 40–60)
LDLC SERPL CALC-MCNC: 45 MG/DL
TRIGL SERPL-MCNC: 94 MG/DL (ref 0–150)
VLDLC SERPL CALC-MCNC: 19 MG/DL

## 2024-11-12 ENCOUNTER — OFFICE VISIT (OUTPATIENT)
Dept: ORTHOPEDIC SURGERY | Age: 59
End: 2024-11-12
Payer: COMMERCIAL

## 2024-11-12 VITALS — WEIGHT: 284 LBS | HEIGHT: 73 IN | BODY MASS INDEX: 37.64 KG/M2

## 2024-11-12 DIAGNOSIS — M25.562 LEFT KNEE PAIN, UNSPECIFIED CHRONICITY: Primary | ICD-10-CM

## 2024-11-12 PROCEDURE — G8427 DOCREV CUR MEDS BY ELIG CLIN: HCPCS

## 2024-11-12 PROCEDURE — G8484 FLU IMMUNIZE NO ADMIN: HCPCS

## 2024-11-12 PROCEDURE — 99203 OFFICE O/P NEW LOW 30 MIN: CPT

## 2024-11-12 PROCEDURE — 1036F TOBACCO NON-USER: CPT

## 2024-11-12 PROCEDURE — G8417 CALC BMI ABV UP PARAM F/U: HCPCS

## 2024-11-12 PROCEDURE — 3017F COLORECTAL CA SCREEN DOC REV: CPT

## 2024-11-12 RX ORDER — NAPROXEN 500 MG/1
500 TABLET ORAL 2 TIMES DAILY WITH MEALS
Qty: 60 TABLET | Refills: 0 | Status: SHIPPED | OUTPATIENT
Start: 2024-11-12

## 2024-11-12 RX ORDER — CYCLOBENZAPRINE HCL 10 MG
10 TABLET ORAL 3 TIMES DAILY PRN
Qty: 21 TABLET | Refills: 0 | Status: SHIPPED | OUTPATIENT
Start: 2024-11-12 | End: 2024-11-19

## 2025-01-31 NOTE — PROGRESS NOTES
although very guarded. Negative Homans sign      Gait: antalgic, with the use of a walking stick as an assistive devices    Alignment:  Neutral alignment     Neuro: Sensation equal bilateral lower extremities    Vascular: 2+ posterior tibialis pulse       Radiology:     4 View X-rays (AP Standing, 45deg PA weight-bearing, lateral, and merchant views) of both knees were obtained and reviewed in office.  Impression: joint space narrowing, medial and PF compartments. No acute osseous abnormalities are seen. No fracture or subluxation    Assessment: Patient is a 59 y.o. male with acute onset left knee pain after an increase in activity at work. Xray diagnosis of osteoarthritis. I believe his acute pain is related to an exacerbation of OA.     Impression:  Visit Diagnoses         Codes    Left knee pain, unspecified chronicity    -  Primary M25.562            Office Procedures:  No orders of the defined types were placed in this encounter.    Orders Placed This Encounter   Procedures    XR KNEE LEFT (MIN 4 VIEWS)     Standing Status:   Future     Number of Occurrences:   1     Standing Expiration Date:   12/12/2024       Plan:  We believe александр is a candidate for referral and treatment by another specialist.     We discussed the many treatment options including medication, and injections. He wishes to undergo left knee IAC if Dr. Butler deems appropriate. He will be scheduled for the next available appointment time.     For tonight we recommend medication options, flexeril to aid with sleep, anti-inflammatory and discussed the importance of icing.     All the patient's questions were answered while in the clinic.  The patient is understanding of all instructions and agrees with the plan.         Follow up in: No follow-ups on file.           Sincerely,  ASHLEY Schulte      11/12/24  5:36 PM         This dictation was performed with a verbal recognition program (DRAGON) and it was checked for errors.  It is possible 
Stable

## 2025-04-03 ASSESSMENT — PATIENT HEALTH QUESTIONNAIRE - PHQ9
1. LITTLE INTEREST OR PLEASURE IN DOING THINGS: NOT AT ALL
SUM OF ALL RESPONSES TO PHQ QUESTIONS 1-9: 0
2. FEELING DOWN, DEPRESSED OR HOPELESS: NOT AT ALL
SUM OF ALL RESPONSES TO PHQ QUESTIONS 1-9: 0
SUM OF ALL RESPONSES TO PHQ QUESTIONS 1-9: 0
SUM OF ALL RESPONSES TO PHQ9 QUESTIONS 1 & 2: 0
SUM OF ALL RESPONSES TO PHQ QUESTIONS 1-9: 0
2. FEELING DOWN, DEPRESSED OR HOPELESS: NOT AT ALL
1. LITTLE INTEREST OR PLEASURE IN DOING THINGS: NOT AT ALL

## 2025-04-04 ENCOUNTER — OFFICE VISIT (OUTPATIENT)
Dept: INTERNAL MEDICINE CLINIC | Age: 60
End: 2025-04-04
Payer: COMMERCIAL

## 2025-04-04 VITALS
SYSTOLIC BLOOD PRESSURE: 144 MMHG | OXYGEN SATURATION: 96 % | WEIGHT: 283.6 LBS | HEIGHT: 74 IN | BODY MASS INDEX: 36.4 KG/M2 | HEART RATE: 83 BPM | TEMPERATURE: 98.2 F | DIASTOLIC BLOOD PRESSURE: 80 MMHG

## 2025-04-04 DIAGNOSIS — I10 PRIMARY HYPERTENSION: ICD-10-CM

## 2025-04-04 DIAGNOSIS — Z00.00 ENCOUNTER FOR WELL ADULT EXAM WITHOUT ABNORMAL FINDINGS: Primary | ICD-10-CM

## 2025-04-04 DIAGNOSIS — E78.2 HYPERLIPIDEMIA, MIXED: ICD-10-CM

## 2025-04-04 DIAGNOSIS — E11.9 TYPE 2 DIABETES MELLITUS WITHOUT COMPLICATION, WITHOUT LONG-TERM CURRENT USE OF INSULIN: ICD-10-CM

## 2025-04-04 PROCEDURE — 3079F DIAST BP 80-89 MM HG: CPT | Performed by: STUDENT IN AN ORGANIZED HEALTH CARE EDUCATION/TRAINING PROGRAM

## 2025-04-04 PROCEDURE — 3077F SYST BP >= 140 MM HG: CPT | Performed by: STUDENT IN AN ORGANIZED HEALTH CARE EDUCATION/TRAINING PROGRAM

## 2025-04-04 PROCEDURE — 99396 PREV VISIT EST AGE 40-64: CPT | Performed by: STUDENT IN AN ORGANIZED HEALTH CARE EDUCATION/TRAINING PROGRAM

## 2025-04-04 RX ORDER — PSEUDOEPHEDRINE HCL 30 MG
100 TABLET ORAL 2 TIMES DAILY
COMMUNITY
Start: 2025-02-14

## 2025-04-04 RX ORDER — LOSARTAN POTASSIUM AND HYDROCHLOROTHIAZIDE 12.5; 1 MG/1; MG/1
1 TABLET ORAL DAILY
Qty: 90 TABLET | Refills: 1 | Status: SHIPPED | OUTPATIENT
Start: 2025-04-04

## 2025-04-04 SDOH — ECONOMIC STABILITY: FOOD INSECURITY: WITHIN THE PAST 12 MONTHS, THE FOOD YOU BOUGHT JUST DIDN'T LAST AND YOU DIDN'T HAVE MONEY TO GET MORE.: NEVER TRUE

## 2025-04-04 SDOH — ECONOMIC STABILITY: FOOD INSECURITY: WITHIN THE PAST 12 MONTHS, YOU WORRIED THAT YOUR FOOD WOULD RUN OUT BEFORE YOU GOT MONEY TO BUY MORE.: NEVER TRUE

## 2025-04-04 ASSESSMENT — ENCOUNTER SYMPTOMS
CHEST TIGHTNESS: 0
ABDOMINAL DISTENTION: 0
COUGH: 0
SHORTNESS OF BREATH: 0
TROUBLE SWALLOWING: 0
WHEEZING: 0
STRIDOR: 0
CONSTIPATION: 0
VOMITING: 0
NAUSEA: 0
DIARRHEA: 0
APNEA: 0
PHOTOPHOBIA: 0
CHOKING: 0
VOICE CHANGE: 0
ABDOMINAL PAIN: 0

## 2025-04-04 NOTE — PROGRESS NOTES
Well Adult Note  Name: Logan Castro Today’s Date: 2025   MRN: [id number] Sex: Male   Age: 59 y.o. Ethnicity: Non- / Non    : 1965 Race: White (non-)      Logan Castro is here for a well adult exam.  Chief Complaint   Patient presents with    Annual Exam    Discuss Labs            Assessment & Plan   Encounter for well adult exam without abnormal findings  Assessment & Plan:  Within normal limits for age- cont to work no ADL issues,immunizations discussed - refused all, no depression ,no cognitive impairment  Colonoscopy up to date last in    Eye exam up to date - annual, no DM retinopathy   Dental exam - due - recommended routine exam   Exercises as tolerated    Findings and recommendations discussed with Pt    Primary hypertension  -     losartan-hydroCHLOROthiazide (HYZAAR) 100-12.5 MG per tablet; Take 1 tablet by mouth daily, Disp-90 tablet, R-1Normal  Type 2 diabetes mellitus without complication, without long-term current use of insulin  Assessment & Plan:  Controlled - managed by Endo   On Mounjaro (with some constipation), Metformin, and  Farxiga   -No hypoglycemia episodes  -Last A1C:   Hemoglobin A1C   Date Value Ref Range Status   2025 6.7 See comment % Final     Comment:     Comment:  Diagnosis of Diabetes: > or = 6.5%  Increased risk of diabetes (Prediabetes): 5.7-6.4%  Glycemic Control: Nonpregnant Adults: <7.0%                    Pregnant: <6.0%          -Hyperlipidemia, LDL goal is <70 mg/dl, on Statin  -Microalbumin: Due  -Hypertension: goal < 130/80  -Foot exam: Due   -Eye exam: Up to date 3/2024 no diabetic retinopathy, positive cataract  -Declined pneumococcal vaccine    The patient was advised to monitor blood sugar at home.   Continue low carb diet.  Diabetes Care: recommend yearly eye exam, foot exam and urine microalbumin to creatinine ratio.   Orders:  -     Albumin/Creatinine Ratio, Urine; Future  Hyperlipidemia, mixed  Assessment &

## 2025-04-04 NOTE — ASSESSMENT & PLAN NOTE
Within normal limits for age- cont to work no ADL issues,immunizations discussed - refused all, no depression ,no cognitive impairment  Colonoscopy up to date last in 2021   Eye exam up to date - annual, no DM retinopathy   Dental exam - due - recommended routine exam   Exercises as tolerated    Findings and recommendations discussed with Pt

## 2025-04-04 NOTE — ASSESSMENT & PLAN NOTE
Controlled - managed by Endo   On Mounjaro (with some constipation), Metformin, and  Farxiga   -No hypoglycemia episodes  -Last A1C:   Hemoglobin A1C   Date Value Ref Range Status   02/01/2025 6.7 See comment % Final     Comment:     Comment:  Diagnosis of Diabetes: > or = 6.5%  Increased risk of diabetes (Prediabetes): 5.7-6.4%  Glycemic Control: Nonpregnant Adults: <7.0%                    Pregnant: <6.0%          -Hyperlipidemia, LDL goal is <70 mg/dl, on Statin  -Microalbumin: Due  -Hypertension: goal < 130/80  -Foot exam: Due   -Eye exam: Up to date 3/2024 no diabetic retinopathy, positive cataract  -Declined pneumococcal vaccine    The patient was advised to monitor blood sugar at home.   Continue low carb diet.  Diabetes Care: recommend yearly eye exam, foot exam and urine microalbumin to creatinine ratio.

## 2025-05-04 PROBLEM — Z00.00 ENCOUNTER FOR WELL ADULT EXAM WITHOUT ABNORMAL FINDINGS: Status: RESOLVED | Noted: 2017-07-11 | Resolved: 2025-05-04

## 2025-05-24 DIAGNOSIS — E11.9 TYPE 2 DIABETES MELLITUS WITHOUT COMPLICATION, WITHOUT LONG-TERM CURRENT USE OF INSULIN (HCC): ICD-10-CM

## 2025-05-24 DIAGNOSIS — E78.2 HYPERLIPIDEMIA, MIXED: ICD-10-CM

## 2025-05-24 LAB
CHOLEST SERPL-MCNC: 114 MG/DL (ref 0–199)
CREAT UR-MCNC: 80.4 MG/DL (ref 39–259)
HDLC SERPL-MCNC: 38 MG/DL (ref 40–60)
LDL CHOLESTEROL: 57 MG/DL
MICROALBUMIN UR DL<=1MG/L-MCNC: <1.2 MG/DL
MICROALBUMIN/CREAT UR: NORMAL MG/G (ref 0–30)
TRIGL SERPL-MCNC: 95 MG/DL (ref 0–150)
VLDLC SERPL CALC-MCNC: 19 MG/DL

## 2025-05-30 ENCOUNTER — RESULTS FOLLOW-UP (OUTPATIENT)
Dept: INTERNAL MEDICINE CLINIC | Age: 60
End: 2025-05-30

## 2025-06-03 DIAGNOSIS — I10 PRIMARY HYPERTENSION: ICD-10-CM

## 2025-06-03 RX ORDER — LOSARTAN POTASSIUM AND HYDROCHLOROTHIAZIDE 12.5; 5 MG/1; MG/1
1 TABLET ORAL DAILY
Qty: 90 TABLET | Refills: 1 | Status: SHIPPED | OUTPATIENT
Start: 2025-06-03

## 2025-06-18 DIAGNOSIS — I10 PRIMARY HYPERTENSION: ICD-10-CM

## 2025-06-18 RX ORDER — LOSARTAN POTASSIUM AND HYDROCHLOROTHIAZIDE 12.5; 1 MG/1; MG/1
TABLET ORAL
Refills: 0 | OUTPATIENT
Start: 2025-06-18

## 2025-08-15 ENCOUNTER — OFFICE VISIT (OUTPATIENT)
Age: 60
End: 2025-08-15

## 2025-08-15 VITALS
WEIGHT: 284.7 LBS | RESPIRATION RATE: 18 BRPM | DIASTOLIC BLOOD PRESSURE: 77 MMHG | BODY MASS INDEX: 34.67 KG/M2 | TEMPERATURE: 97.3 F | SYSTOLIC BLOOD PRESSURE: 126 MMHG | OXYGEN SATURATION: 96 % | HEIGHT: 76 IN | HEART RATE: 85 BPM

## 2025-08-15 DIAGNOSIS — L03.312 CELLULITIS OF BACK EXCEPT BUTTOCK: Primary | ICD-10-CM

## 2025-08-15 RX ORDER — MUPIROCIN 2 %
OINTMENT (GRAM) TOPICAL
Qty: 22 G | Refills: 0 | Status: SHIPPED | OUTPATIENT
Start: 2025-08-15

## 2025-08-15 RX ORDER — CEPHALEXIN 500 MG/1
500 CAPSULE ORAL 4 TIMES DAILY
Qty: 28 CAPSULE | Refills: 0 | Status: SHIPPED | OUTPATIENT
Start: 2025-08-15 | End: 2025-08-22

## 2025-08-28 ENCOUNTER — OFFICE VISIT (OUTPATIENT)
Age: 60
End: 2025-08-28

## 2025-08-28 ENCOUNTER — HOSPITAL ENCOUNTER (EMERGENCY)
Age: 60
Discharge: HOME | End: 2025-08-28
Payer: COMMERCIAL

## 2025-08-28 VITALS
SYSTOLIC BLOOD PRESSURE: 152 MMHG | DIASTOLIC BLOOD PRESSURE: 76 MMHG | TEMPERATURE: 97.9 F | HEIGHT: 76 IN | HEART RATE: 78 BPM | OXYGEN SATURATION: 98 % | BODY MASS INDEX: 34.7 KG/M2 | RESPIRATION RATE: 20 BRPM | WEIGHT: 285 LBS

## 2025-08-28 VITALS
DIASTOLIC BLOOD PRESSURE: 75 MMHG | HEART RATE: 77 BPM | OXYGEN SATURATION: 94 % | TEMPERATURE: 98.7 F | SYSTOLIC BLOOD PRESSURE: 126 MMHG | WEIGHT: 284 LBS | HEIGHT: 76 IN | BODY MASS INDEX: 34.58 KG/M2

## 2025-08-28 DIAGNOSIS — L03.312 CELLULITIS OF BACK EXCEPT BUTTOCK: Primary | ICD-10-CM

## 2025-08-28 DIAGNOSIS — L03.818 CELLULITIS OF OTHER SPECIFIED SITE: Primary | ICD-10-CM

## 2025-08-28 DIAGNOSIS — E11.9 TYPE 2 DIABETES MELLITUS WITHOUT COMPLICATION, UNSPECIFIED WHETHER LONG TERM INSULIN USE (HCC): ICD-10-CM

## 2025-08-28 LAB
ALBUMIN SERPL-MCNC: 3.9 G/DL (ref 3.4–5)
ALBUMIN/GLOB SERPL: 1.4 {RATIO} (ref 1.1–2.2)
ALP SERPL-CCNC: 72 U/L (ref 40–129)
ALT SERPL-CCNC: 24 U/L (ref 10–40)
ANION GAP SERPL CALCULATED.3IONS-SCNC: 12 MMOL/L (ref 3–16)
AST SERPL-CCNC: 25 U/L (ref 15–37)
BASOPHILS # BLD: 0.3 K/UL (ref 0–0.2)
BASOPHILS NFR BLD: 3.7 %
BILIRUB SERPL-MCNC: 0.5 MG/DL (ref 0–1)
BUN SERPL-MCNC: 28 MG/DL (ref 7–20)
CALCIUM SERPL-MCNC: 9.4 MG/DL (ref 8.3–10.6)
CHLORIDE SERPL-SCNC: 102 MMOL/L (ref 99–110)
CO2 SERPL-SCNC: 23 MMOL/L (ref 21–32)
CREAT SERPL-MCNC: 1.1 MG/DL (ref 0.8–1.3)
DEPRECATED RDW RBC AUTO: 13.9 % (ref 12.4–15.4)
EOSINOPHIL # BLD: 0.2 K/UL (ref 0–0.6)
EOSINOPHIL NFR BLD: 2.3 %
GFR SERPLBLD CREATININE-BSD FMLA CKD-EPI: 77 ML/MIN/{1.73_M2}
GLUCOSE SERPL-MCNC: 117 MG/DL (ref 70–99)
HCT VFR BLD AUTO: 47.1 % (ref 40.5–52.5)
HGB BLD-MCNC: 15.9 G/DL (ref 13.5–17.5)
LYMPHOCYTES # BLD: 1 K/UL (ref 1–5.1)
LYMPHOCYTES NFR BLD: 14.5 %
MCH RBC QN AUTO: 30.6 PG (ref 26–34)
MCHC RBC AUTO-ENTMCNC: 33.7 G/DL (ref 31–36)
MCV RBC AUTO: 90.8 FL (ref 80–100)
MONOCYTES # BLD: 0.8 K/UL (ref 0–1.3)
MONOCYTES NFR BLD: 11.2 %
NEUTROPHILS # BLD: 4.7 K/UL (ref 1.7–7.7)
NEUTROPHILS NFR BLD: 68.3 %
PLATELET # BLD AUTO: 153 K/UL (ref 135–450)
PMV BLD AUTO: 9.5 FL (ref 5–10.5)
POTASSIUM SERPL-SCNC: 3.9 MMOL/L (ref 3.5–5.1)
PROT SERPL-MCNC: 6.6 G/DL (ref 6.4–8.2)
RBC # BLD AUTO: 5.18 M/UL (ref 4.2–5.9)
SODIUM SERPL-SCNC: 137 MMOL/L (ref 136–145)
WBC # BLD AUTO: 7 K/UL (ref 4–11)

## 2025-08-28 PROCEDURE — 99283 EMERGENCY DEPT VISIT LOW MDM: CPT

## 2025-08-28 PROCEDURE — 85025 COMPLETE CBC W/AUTO DIFF WBC: CPT

## 2025-08-28 PROCEDURE — 6370000000 HC RX 637 (ALT 250 FOR IP): Performed by: NURSE PRACTITIONER

## 2025-08-28 PROCEDURE — 80053 COMPREHEN METABOLIC PANEL: CPT

## 2025-08-28 RX ORDER — SULFAMETHOXAZOLE AND TRIMETHOPRIM 800; 160 MG/1; MG/1
1 TABLET ORAL 2 TIMES DAILY
Qty: 20 TABLET | Refills: 0 | Status: SHIPPED | OUTPATIENT
Start: 2025-08-28 | End: 2025-09-07

## 2025-08-28 RX ORDER — SULFAMETHOXAZOLE AND TRIMETHOPRIM 800; 160 MG/1; MG/1
1 TABLET ORAL ONCE
Status: COMPLETED | OUTPATIENT
Start: 2025-08-28 | End: 2025-08-28

## 2025-08-28 RX ORDER — CEPHALEXIN 500 MG/1
500 CAPSULE ORAL 4 TIMES DAILY
Qty: 40 CAPSULE | Refills: 0 | Status: SHIPPED | OUTPATIENT
Start: 2025-08-28 | End: 2025-09-07

## 2025-08-28 RX ORDER — CEPHALEXIN 500 MG/1
500 CAPSULE ORAL ONCE
Status: COMPLETED | OUTPATIENT
Start: 2025-08-28 | End: 2025-08-28

## 2025-08-28 RX ADMIN — CEPHALEXIN 500 MG: 500 CAPSULE ORAL at 21:40

## 2025-08-28 RX ADMIN — SULFAMETHOXAZOLE AND TRIMETHOPRIM 1 TABLET: 800; 160 TABLET ORAL at 21:40

## 2025-08-28 ASSESSMENT — LIFESTYLE VARIABLES
HOW MANY STANDARD DRINKS CONTAINING ALCOHOL DO YOU HAVE ON A TYPICAL DAY: PATIENT DOES NOT DRINK
HOW OFTEN DO YOU HAVE A DRINK CONTAINING ALCOHOL: NEVER

## 2025-08-28 ASSESSMENT — PAIN - FUNCTIONAL ASSESSMENT: PAIN_FUNCTIONAL_ASSESSMENT: 0-10

## 2025-08-28 ASSESSMENT — PAIN SCALES - GENERAL
PAINLEVEL_OUTOF10: 0
PAINLEVEL_OUTOF10: 0